# Patient Record
Sex: FEMALE | Race: ASIAN | NOT HISPANIC OR LATINO
[De-identification: names, ages, dates, MRNs, and addresses within clinical notes are randomized per-mention and may not be internally consistent; named-entity substitution may affect disease eponyms.]

---

## 2022-03-08 PROBLEM — Z00.00 ENCOUNTER FOR PREVENTIVE HEALTH EXAMINATION: Status: ACTIVE | Noted: 2022-03-08

## 2022-03-09 ENCOUNTER — NON-APPOINTMENT (OUTPATIENT)
Age: 62
End: 2022-03-09

## 2022-03-14 ENCOUNTER — NON-APPOINTMENT (OUTPATIENT)
Age: 62
End: 2022-03-14

## 2022-03-14 ENCOUNTER — APPOINTMENT (OUTPATIENT)
Dept: ULTRASOUND IMAGING | Facility: CLINIC | Age: 62
End: 2022-03-14
Payer: COMMERCIAL

## 2022-03-14 ENCOUNTER — APPOINTMENT (OUTPATIENT)
Dept: MAMMOGRAPHY | Facility: CLINIC | Age: 62
End: 2022-03-14
Payer: COMMERCIAL

## 2022-03-14 ENCOUNTER — OUTPATIENT (OUTPATIENT)
Dept: OUTPATIENT SERVICES | Facility: HOSPITAL | Age: 62
LOS: 1 days | End: 2022-03-14
Payer: COMMERCIAL

## 2022-03-14 DIAGNOSIS — Z00.00 ENCOUNTER FOR GENERAL ADULT MEDICAL EXAMINATION WITHOUT ABNORMAL FINDINGS: ICD-10-CM

## 2022-03-14 PROCEDURE — 77063 BREAST TOMOSYNTHESIS BI: CPT | Mod: 26,52

## 2022-03-14 PROCEDURE — 76641 ULTRASOUND BREAST COMPLETE: CPT | Mod: 26,RT

## 2022-03-14 PROCEDURE — 76641 ULTRASOUND BREAST COMPLETE: CPT

## 2022-03-14 PROCEDURE — 77067 SCR MAMMO BI INCL CAD: CPT | Mod: 26,52

## 2022-03-14 PROCEDURE — 77063 BREAST TOMOSYNTHESIS BI: CPT

## 2022-03-14 PROCEDURE — 77067 SCR MAMMO BI INCL CAD: CPT

## 2022-03-23 ENCOUNTER — NON-APPOINTMENT (OUTPATIENT)
Age: 62
End: 2022-03-23

## 2022-03-23 ENCOUNTER — APPOINTMENT (OUTPATIENT)
Dept: SURGICAL ONCOLOGY | Facility: CLINIC | Age: 62
End: 2022-03-23
Payer: COMMERCIAL

## 2022-03-23 VITALS
BODY MASS INDEX: 32.25 KG/M2 | SYSTOLIC BLOOD PRESSURE: 163 MMHG | HEART RATE: 53 BPM | WEIGHT: 160 LBS | RESPIRATION RATE: 18 BRPM | HEIGHT: 59 IN | DIASTOLIC BLOOD PRESSURE: 70 MMHG | TEMPERATURE: 97.5 F | OXYGEN SATURATION: 95 %

## 2022-03-23 PROCEDURE — 99244 OFF/OP CNSLTJ NEW/EST MOD 40: CPT

## 2022-03-30 NOTE — ASSESSMENT
[FreeTextEntry1] : Recurrent left breast cancer\par JODI\par BIRADS 2  right breast imaging\par Cont Femara as per med onc\par RTO 1 yr. after yearly right breast imaging

## 2022-03-30 NOTE — ADDENDUM
[FreeTextEntry1] : I, Sarah Jarrett, acted solely as a scribe for Dr. Osman Inman on this date 03/23/2022.\par

## 2022-03-30 NOTE — CONSULT LETTER
[Dear  ___] : Dear  [unfilled], [Consult Letter:] : I had the pleasure of evaluating your patient, [unfilled]. [Please see my note below.] : Please see my note below. [Sincerely,] : Sincerely, [FreeTextEntry3] : Osman Inman MD FACS\par \par

## 2022-03-30 NOTE — PHYSICAL EXAM
[Normal] : supple, no neck mass and thyroid not enlarged [Normal Neck Lymph Nodes] : normal neck lymph nodes  [Normal Supraclavicular Lymph Nodes] : normal supraclavicular lymph nodes [Normal Groin Lymph Nodes] : normal groin lymph nodes [Normal Axillary Lymph Nodes] : normal axillary lymph nodes [Normal] : oriented to person, place and time, with appropriate affect [de-identified] : no masses/adenopathy bilaterally

## 2022-03-30 NOTE — HISTORY OF PRESENT ILLNESS
[de-identified] : Patient is a 60 y/o female who presents an initial consultation \par \par L lumpectomy, chemo, XRT, PRATT 1998\par Recurrence 2002 - MTX, chemo, PRATT\par On AI since 2004 w min SEs\par \par Right mammo/sono 3/14/22 - BIRADS 2\par \par No c/o\par Denies palpable lumps or nipple discharge.

## 2022-10-10 ENCOUNTER — APPOINTMENT (OUTPATIENT)
Dept: PULMONOLOGY | Facility: CLINIC | Age: 62
End: 2022-10-10

## 2022-10-10 VITALS
WEIGHT: 159 LBS | OXYGEN SATURATION: 99 % | HEIGHT: 59 IN | BODY MASS INDEX: 32.05 KG/M2 | DIASTOLIC BLOOD PRESSURE: 80 MMHG | HEART RATE: 65 BPM | TEMPERATURE: 98.9 F | SYSTOLIC BLOOD PRESSURE: 132 MMHG

## 2022-10-10 DIAGNOSIS — Z82.5 FAMILY HISTORY OF ASTHMA AND OTHER CHRONIC LOWER RESPIRATORY DISEASES: ICD-10-CM

## 2022-10-10 DIAGNOSIS — Z85.3 PERSONAL HISTORY OF MALIGNANT NEOPLASM OF BREAST: ICD-10-CM

## 2022-10-10 PROCEDURE — 94060 EVALUATION OF WHEEZING: CPT

## 2022-10-10 PROCEDURE — 94727 GAS DIL/WSHOT DETER LNG VOL: CPT

## 2022-10-10 PROCEDURE — 94729 DIFFUSING CAPACITY: CPT

## 2022-10-10 PROCEDURE — 99204 OFFICE O/P NEW MOD 45 MIN: CPT | Mod: 25

## 2022-10-10 RX ORDER — LETROZOLE TABLETS 2.5 MG/1
TABLET, FILM COATED ORAL
Refills: 0 | Status: ACTIVE | COMMUNITY

## 2022-10-10 RX ORDER — ATORVASTATIN CALCIUM 10 MG/1
10 TABLET, FILM COATED ORAL
Refills: 0 | Status: ACTIVE | COMMUNITY

## 2022-10-10 RX ORDER — AMLODIPINE BESYLATE 10 MG/1
10 TABLET ORAL
Refills: 0 | Status: ACTIVE | COMMUNITY

## 2022-10-10 RX ORDER — METOPROLOL SUCCINATE 50 MG/1
50 TABLET, EXTENDED RELEASE ORAL
Refills: 0 | Status: ACTIVE | COMMUNITY

## 2022-10-10 RX ORDER — ALENDRONATE SODIUM 70 MG/1
70 TABLET ORAL
Refills: 0 | Status: ACTIVE | COMMUNITY

## 2022-10-10 RX ORDER — LEVOTHYROXINE SODIUM 88 UG/1
88 TABLET ORAL
Refills: 0 | Status: ACTIVE | COMMUNITY

## 2022-10-10 NOTE — PROCEDURE
[FreeTextEntry1] : Chest x-ray 10/7/22: Normal. \par \par PFT 10/10/22: Spirometry was normal.  Lung volumes were normal.  Mild reduction in diffusion noted.

## 2022-10-10 NOTE — HISTORY OF PRESENT ILLNESS
[Never] : never [TextBox_4] : She is a 62-year-old woman with a history of hypertension, hyperlipidemia, hypothyroidism and breast cancer.  She had COVID in September 2022.  She presented with a cough.  She denied any prior history of pulmonary disease other than recurrent bronchitis.  She never smoked.\par \par Her current cough for the most part is nonproductive.  She denied any constitutional symptoms.  No shortness of breath or wheezing. [Difficulty Initiating Sleep] : does not have difficulty initiating sleep [Fatigue] : no fatigue

## 2022-10-10 NOTE — DISCUSSION/SUMMARY
[FreeTextEntry1] : She is a 62-year-old woman with a history of hypertension, hyperlipidemia, hypothyroidism and breast cancer.  She had COVID in September 2022.  She presented with a cough.  She denied any prior history of pulmonary disease other than recurrent bronchitis.  She never smoked.\par \par Her current cough for the most part is nonproductive.  She denied any constitutional symptoms.  No shortness of breath or wheezing.\par \par Her cough is most probably on the basis of reactive airways disease.  She was given a trial of Breo and advised to use it for 1 month.  If the cough does not resolve I would like to see her again at that time.

## 2022-10-10 NOTE — PHYSICAL EXAM
[No Acute Distress] : no acute distress [No Neck Mass] : no neck mass [Normal S1, S2] : normal s1, s2 [No Resp Distress] : no resp distress [Clear to Auscultation Bilaterally] : clear to auscultation bilaterally [No Abnormalities] : no abnormalities [No HSM] : no hsm [No Edema] : no edema [Normal Turgor] : normal turgor [No Focal Deficits] : no focal deficits [Oriented x3] : oriented x3 [Normal Affect] : normal affect

## 2022-10-10 NOTE — REVIEW OF SYSTEMS
[Cough] : cough [Thyroid Problem] : thyroid problem [Fever] : no fever [Postnasal Drip] : no postnasal drip [Hemoptysis] : no hemoptysis [Sputum] : no sputum [Dyspnea] : no dyspnea [Wheezing] : no wheezing [Chest Discomfort] : no chest discomfort [Palpitations] : no palpitations [GERD] : no gerd [Myalgias] : no myalgias [Rash] : no rash [History of Iron Deficiency] : no history of iron deficiency [Headache] : no headache [Anxiety] : no anxiety [Diabetes] : no diabetes

## 2022-10-26 ENCOUNTER — TRANSCRIPTION ENCOUNTER (OUTPATIENT)
Age: 62
End: 2022-10-26

## 2022-11-21 ENCOUNTER — APPOINTMENT (OUTPATIENT)
Dept: PULMONOLOGY | Facility: CLINIC | Age: 62
End: 2022-11-21

## 2022-11-21 VITALS
SYSTOLIC BLOOD PRESSURE: 136 MMHG | DIASTOLIC BLOOD PRESSURE: 80 MMHG | OXYGEN SATURATION: 95 % | HEART RATE: 70 BPM | WEIGHT: 160 LBS | BODY MASS INDEX: 32.32 KG/M2 | TEMPERATURE: 97.7 F

## 2022-11-21 DIAGNOSIS — Z86.16 PERSONAL HISTORY OF COVID-19: ICD-10-CM

## 2022-11-21 PROCEDURE — 99213 OFFICE O/P EST LOW 20 MIN: CPT

## 2022-11-21 RX ORDER — PREDNISONE 20 MG/1
20 TABLET ORAL DAILY
Qty: 10 | Refills: 1 | Status: ACTIVE | COMMUNITY
Start: 2022-11-21 | End: 1900-01-01

## 2022-11-21 NOTE — PHYSICAL EXAM
[No Acute Distress] : no acute distress [No Neck Mass] : no neck mass [Normal S1, S2] : normal s1, s2 [No Resp Distress] : no resp distress [No Abnormalities] : no abnormalities [No HSM] : no hsm [No Edema] : no edema [Normal Turgor] : normal turgor [No Focal Deficits] : no focal deficits [Oriented x3] : oriented x3 [Normal Affect] : normal affect [Normal Oropharynx] : normal oropharynx [Rhonchi] : rhonchi [TextBox_68] : minimal rhonchi

## 2022-11-21 NOTE — HISTORY OF PRESENT ILLNESS
[Never] : never [TextBox_4] : She is a 62-year-old woman with a history of hypertension, hyperlipidemia, hypothyroidism and breast cancer.  She had COVID in September 2022.  She presented with a cough.  She denied any prior history of pulmonary disease other than recurrent bronchitis.  She never smoked.\par \par She is still coughing.  It is worse at night.  She uses CPAP at night.  She has been using CPAP for 10 years.  The cough is nonproductive for the most part.  She was recently given doxycycline however the cough has not resolved. [Difficulty Initiating Sleep] : does not have difficulty initiating sleep [Fatigue] : no fatigue

## 2022-11-21 NOTE — DISCUSSION/SUMMARY
[FreeTextEntry1] : She is a 62 year-old woman with a history of hypertension, hyperlipidemia, hypothyroidism and breast cancer.  She had COVID in September 2022.  She presented with a cough.  She denied any prior history of pulmonary disease other than recurrent bronchitis.  She never smoked.\par \par The cough is most likely on the basis of reactive airways disease.  She was given prednisone 20 mg/day for 10 days.  Montelukast was also added.  She is to continue with Breo for the time being.  A chest radiograph was requested.  Follow-up with me in 1 month advised.  I will see her sooner than that if her cough gets any worse.

## 2022-11-21 NOTE — REASON FOR VISIT
Problem List Items Addressed This Visit     Supervision of normal intrauterine pregnancy in multigravida, third trimester - Primary     Feels well overall  Good fetal movement  No ctx, LOF, bleeding  GBS neg  Plan for repeat   but will likely TOLAC if she goes into labor first  [Cough] : cough [Follow-Up] : a follow-up visit

## 2022-11-21 NOTE — REVIEW OF SYSTEMS
[Cough] : cough [Thyroid Problem] : thyroid problem [Fever] : no fever [Chills] : no chills [Postnasal Drip] : no postnasal drip [Hemoptysis] : no hemoptysis [Chest Tightness] : no chest tightness [Sputum] : no sputum [Dyspnea] : no dyspnea [Wheezing] : no wheezing [Chest Discomfort] : no chest discomfort [Palpitations] : no palpitations [GERD] : no gerd [Myalgias] : no myalgias [Rash] : no rash [History of Iron Deficiency] : no history of iron deficiency [Headache] : no headache [Anxiety] : no anxiety [Diabetes] : no diabetes

## 2022-12-16 ENCOUNTER — APPOINTMENT (OUTPATIENT)
Dept: PULMONOLOGY | Facility: CLINIC | Age: 62
End: 2022-12-16

## 2022-12-16 VITALS
SYSTOLIC BLOOD PRESSURE: 134 MMHG | OXYGEN SATURATION: 99 % | HEART RATE: 67 BPM | DIASTOLIC BLOOD PRESSURE: 76 MMHG | TEMPERATURE: 98 F

## 2022-12-16 PROCEDURE — 99214 OFFICE O/P EST MOD 30 MIN: CPT

## 2022-12-16 NOTE — DISCUSSION/SUMMARY
[FreeTextEntry1] : She is a 62 year-old woman with a history of hypertension, hyperlipidemia, hypothyroidism, breast cancer and HILDA.  She had COVID in September 2022.  She never smoked.\par \par She continues to complain of a chronic cough and mucus production.  This has been a persistent problem for years, she says.  The history is suggestive of bronchiectasis.  A CT of the chest will be obtained.  Blood work will be obtained as well.  For the time being she is to continue with Breo and montelukast.  She was placed on antibiotics by her primary care provider which she will continue with.  She is to continue with CPAP for her HILDA.

## 2022-12-16 NOTE — HISTORY OF PRESENT ILLNESS
[Never] : never [Obstructive Sleep Apnea] : obstructive sleep apnea [TextBox_4] : She is a 62 year-old woman with a history of hypertension, hyperlipidemia, hypothyroidism, breast cancer and HILDA.  She had COVID in September 2022.  She presented with a cough.  She denied any prior history of pulmonary disease other than recurrent bronchitis.  She never smoked. She uses CPAP at night.  She has been using CPAP for 10 years.  \par \par She is still coughing. On Breo. It is not helping much. Was given Cipro this week.  [Difficulty Initiating Sleep] : does not have difficulty initiating sleep [Fatigue] : no fatigue

## 2022-12-16 NOTE — REVIEW OF SYSTEMS
[Cough] : cough [Thyroid Problem] : thyroid problem [Fever] : no fever [Fatigue] : no fatigue [Postnasal Drip] : no postnasal drip [Hemoptysis] : no hemoptysis [Chest Tightness] : no chest tightness [Sputum] : no sputum [Dyspnea] : no dyspnea [Pleuritic Pain] : no pleuritic pain [Wheezing] : no wheezing [Chest Discomfort] : no chest discomfort [Palpitations] : no palpitations [GERD] : no gerd [Myalgias] : no myalgias [Rash] : no rash [History of Iron Deficiency] : no history of iron deficiency [Headache] : no headache [Anxiety] : no anxiety [Diabetes] : no diabetes

## 2022-12-16 NOTE — PHYSICAL EXAM
[No Acute Distress] : no acute distress [Normal Oropharynx] : normal oropharynx [No Neck Mass] : no neck mass [Normal S1, S2] : normal s1, s2 [No Resp Distress] : no resp distress [Rhonchi] : rhonchi [No Abnormalities] : no abnormalities [No HSM] : no hsm [No Edema] : no edema [Normal Turgor] : normal turgor [No Focal Deficits] : no focal deficits [Oriented x3] : oriented x3 [Normal Affect] : normal affect [Normal Rate/Rhythm] : normal rate/rhythm [No Acc Muscle Use] : no acc muscle use [Benign] : benign [Normal Gait] : normal gait [No Clubbing] : no clubbing [No Cyanosis] : no cyanosis [TextBox_68] : minimal rhonchi

## 2023-01-11 ENCOUNTER — OUTPATIENT (OUTPATIENT)
Dept: OUTPATIENT SERVICES | Facility: HOSPITAL | Age: 63
LOS: 1 days | End: 2023-01-11
Payer: COMMERCIAL

## 2023-01-11 ENCOUNTER — APPOINTMENT (OUTPATIENT)
Dept: CT IMAGING | Facility: CLINIC | Age: 63
End: 2023-01-11
Payer: COMMERCIAL

## 2023-01-11 DIAGNOSIS — Z00.8 ENCOUNTER FOR OTHER GENERAL EXAMINATION: ICD-10-CM

## 2023-01-11 PROCEDURE — 71250 CT THORAX DX C-: CPT | Mod: 26

## 2023-01-11 PROCEDURE — 71250 CT THORAX DX C-: CPT

## 2023-01-26 ENCOUNTER — LABORATORY RESULT (OUTPATIENT)
Age: 63
End: 2023-01-26

## 2023-01-27 LAB
DEPRECATED KAPPA LC FREE/LAMBDA SER: 1.44 RATIO
IGA SER QL IEP: 285 MG/DL
IGG SER QL IEP: 1199 MG/DL
IGM SER QL IEP: 68 MG/DL
KAPPA LC CSF-MCNC: 1.04 MG/DL
KAPPA LC SERPL-MCNC: 1.5 MG/DL

## 2023-02-01 LAB
A ALTERNATA IGE QN: <0.1 KUA/L
A FUMIGATUS IGE QN: <0.1 KUA/L
C ALBICANS IGE QN: <0.1 KUA/L
C HERBARUM IGE QN: <0.1 KUA/L
CAT DANDER IGE QN: <0.1 KUA/L
COMMON RAGWEED IGE QN: <0.1 KUA/L
D FARINAE IGE QN: <0.1 KUA/L
D PTERONYSS IGE QN: <0.1 KUA/L
DEPRECATED A ALTERNATA IGE RAST QL: 0
DEPRECATED A FUMIGATUS IGE RAST QL: 0
DEPRECATED C ALBICANS IGE RAST QL: 0
DEPRECATED C HERBARUM IGE RAST QL: 0
DEPRECATED CAT DANDER IGE RAST QL: 0
DEPRECATED COMMON RAGWEED IGE RAST QL: 0
DEPRECATED D FARINAE IGE RAST QL: 0
DEPRECATED D PTERONYSS IGE RAST QL: 0
DEPRECATED DOG DANDER IGE RAST QL: 0
DEPRECATED M RACEMOSUS IGE RAST QL: 0
DEPRECATED ROACH IGE RAST QL: 0
DEPRECATED TIMOTHY IGE RAST QL: 0
DEPRECATED WHITE OAK IGE RAST QL: 0
DOG DANDER IGE QN: <0.1 KUA/L
M RACEMOSUS IGE QN: <0.1 KUA/L
ROACH IGE QN: <0.1 KUA/L
TIMOTHY IGE QN: <0.1 KUA/L
WHITE OAK IGE QN: <0.1 KUA/L

## 2023-02-08 ENCOUNTER — APPOINTMENT (OUTPATIENT)
Dept: PULMONOLOGY | Facility: CLINIC | Age: 63
End: 2023-02-08
Payer: COMMERCIAL

## 2023-02-08 VITALS — WEIGHT: 161.2 LBS | OXYGEN SATURATION: 96 % | HEART RATE: 62 BPM | BODY MASS INDEX: 32.56 KG/M2

## 2023-02-08 DIAGNOSIS — Z86.16 PERSONAL HISTORY OF COVID-19: ICD-10-CM

## 2023-02-08 PROCEDURE — 99214 OFFICE O/P EST MOD 30 MIN: CPT

## 2023-02-08 NOTE — REVIEW OF SYSTEMS
[Cough] : cough [Thyroid Problem] : thyroid problem [Fever] : no fever [Postnasal Drip] : no postnasal drip [Hemoptysis] : no hemoptysis [Dyspnea] : no dyspnea [Wheezing] : no wheezing [Chest Discomfort] : no chest discomfort [Palpitations] : no palpitations [GERD] : no gerd [Myalgias] : no myalgias [Rash] : no rash [History of Iron Deficiency] : no history of iron deficiency [Headache] : no headache [Anxiety] : no anxiety [Diabetes] : no diabetes

## 2023-02-08 NOTE — PHYSICAL EXAM
[No Acute Distress] : no acute distress [No Neck Mass] : no neck mass [Normal Rate/Rhythm] : normal rate/rhythm [Normal S1, S2] : normal s1, s2 [No Resp Distress] : no resp distress [No Acc Muscle Use] : no acc muscle use [No Abnormalities] : no abnormalities [Benign] : benign [No HSM] : no hsm [Normal Gait] : normal gait [No Clubbing] : no clubbing [No Cyanosis] : no cyanosis [No Edema] : no edema [Normal Turgor] : normal turgor [No Focal Deficits] : no focal deficits [Oriented x3] : oriented x3 [Normal Affect] : normal affect [Clear to Auscultation Bilaterally] : clear to auscultation bilaterally [TextBox_68] : minimal rhonchi

## 2023-02-08 NOTE — HISTORY OF PRESENT ILLNESS
[Never] : never [Obstructive Sleep Apnea] : obstructive sleep apnea [TextBox_4] : She is a 62 year-old woman with a history of hypertension, hyperlipidemia, hypothyroidism, breast cancer and HILDA.  She had COVID in September 2022.  She presented with a cough.  She denied any prior history of pulmonary disease other than recurrent bronchitis.  She never smoked. She uses CPAP at night.  She has been using CPAP for 10 years.  \par \par She is still coughing at night. The cough is less during the day now. \par \par On Breo, montelukast and albuterol as needed.  [Difficulty Initiating Sleep] : does not have difficulty initiating sleep [Fatigue] : no fatigue

## 2023-02-08 NOTE — DISCUSSION/SUMMARY
[FreeTextEntry1] : She is a 62 year-old woman with a history of hypertension, hyperlipidemia, hypothyroidism, breast cancer and HILDA.  She had COVID in September 2022.  She never smoked.\par \par Her cough has improved.  Her CT was negative.  She was advised to continue with Breo and montelukast.  She is on CPAP and takes care of her machine regularly.  No further investigation needed at this time.  Her medications were n renewed.  She was started on Flonase for postnasal drip.  \par \par Follow-up in 6 months advised.\par

## 2023-02-08 NOTE — PROCEDURE
[FreeTextEntry1] : Chest x-ray 10/7/22: Normal. \par \par PFT 10/10/22: Spirometry was normal.  Lung volumes were normal.  Mild reduction in diffusion noted.\par \par CT Chest 1/11/23: No bronchiectasis.  No nodules.  No effusions.

## 2023-02-27 ENCOUNTER — RX RENEWAL (OUTPATIENT)
Age: 63
End: 2023-02-27

## 2023-03-14 ENCOUNTER — NON-APPOINTMENT (OUTPATIENT)
Age: 63
End: 2023-03-14

## 2023-03-15 NOTE — PHYSICAL EXAM
[Normal] : supple, no neck mass and thyroid not enlarged [Normal Neck Lymph Nodes] : normal neck lymph nodes  [Normal Supraclavicular Lymph Nodes] : normal supraclavicular lymph nodes [Normal Groin Lymph Nodes] : normal groin lymph nodes [Normal Axillary Lymph Nodes] : normal axillary lymph nodes [Normal] : oriented to person, place and time, with appropriate affect [de-identified] : no masses/adenopathy bilaterally

## 2023-03-15 NOTE — HISTORY OF PRESENT ILLNESS
[de-identified] : Patient is a 63 y/o female who presents a follow up.\par \par L lumpectomy, chemo, XRT, PRATT 1998\par Recurrence 2002 - MTX, chemo, PRATT\par On AI since 2004 w min SEs\par \par Right mammo/sono 3/14/22 - BIRADS 2\par \par No c/o\par Denies palpable lumps or nipple discharge. no

## 2023-03-15 NOTE — ADDENDUM
[FreeTextEntry1] : I, Sarah Jarrett, acted solely as a scribe for Dr. Osman Inman on this date 03/22/2023.

## 2023-03-22 ENCOUNTER — APPOINTMENT (OUTPATIENT)
Dept: SURGICAL ONCOLOGY | Facility: CLINIC | Age: 63
End: 2023-03-22
Payer: COMMERCIAL

## 2023-03-31 ENCOUNTER — APPOINTMENT (OUTPATIENT)
Dept: SURGICAL ONCOLOGY | Facility: CLINIC | Age: 63
End: 2023-03-31
Payer: COMMERCIAL

## 2023-03-31 ENCOUNTER — RESULT REVIEW (OUTPATIENT)
Age: 63
End: 2023-03-31

## 2023-04-28 ENCOUNTER — APPOINTMENT (OUTPATIENT)
Dept: MAMMOGRAPHY | Facility: CLINIC | Age: 63
End: 2023-04-28
Payer: COMMERCIAL

## 2023-04-28 ENCOUNTER — RESULT REVIEW (OUTPATIENT)
Age: 63
End: 2023-04-28

## 2023-04-28 ENCOUNTER — OUTPATIENT (OUTPATIENT)
Dept: OUTPATIENT SERVICES | Facility: HOSPITAL | Age: 63
LOS: 1 days | End: 2023-04-28
Payer: COMMERCIAL

## 2023-04-28 ENCOUNTER — APPOINTMENT (OUTPATIENT)
Dept: ULTRASOUND IMAGING | Facility: CLINIC | Age: 63
End: 2023-04-28
Payer: COMMERCIAL

## 2023-04-28 DIAGNOSIS — C50.912 MALIGNANT NEOPLASM OF UNSPECIFIED SITE OF LEFT FEMALE BREAST: ICD-10-CM

## 2023-04-28 PROCEDURE — 77067 SCR MAMMO BI INCL CAD: CPT | Mod: 26,RT,52

## 2023-04-28 PROCEDURE — 76641 ULTRASOUND BREAST COMPLETE: CPT

## 2023-04-28 PROCEDURE — 77063 BREAST TOMOSYNTHESIS BI: CPT

## 2023-04-28 PROCEDURE — 76641 ULTRASOUND BREAST COMPLETE: CPT | Mod: 26,RT

## 2023-04-28 PROCEDURE — 77067 SCR MAMMO BI INCL CAD: CPT

## 2023-04-28 PROCEDURE — 77063 BREAST TOMOSYNTHESIS BI: CPT | Mod: 26,52

## 2023-05-08 ENCOUNTER — APPOINTMENT (OUTPATIENT)
Dept: ULTRASOUND IMAGING | Facility: CLINIC | Age: 63
End: 2023-05-08

## 2023-05-08 ENCOUNTER — OUTPATIENT (OUTPATIENT)
Dept: OUTPATIENT SERVICES | Facility: HOSPITAL | Age: 63
LOS: 1 days | End: 2023-05-08
Payer: COMMERCIAL

## 2023-05-08 ENCOUNTER — APPOINTMENT (OUTPATIENT)
Dept: ULTRASOUND IMAGING | Facility: CLINIC | Age: 63
End: 2023-05-08
Payer: COMMERCIAL

## 2023-05-08 DIAGNOSIS — Z00.8 ENCOUNTER FOR OTHER GENERAL EXAMINATION: ICD-10-CM

## 2023-05-08 PROCEDURE — 76830 TRANSVAGINAL US NON-OB: CPT | Mod: 26

## 2023-05-08 PROCEDURE — 76830 TRANSVAGINAL US NON-OB: CPT

## 2023-05-08 PROCEDURE — 76856 US EXAM PELVIC COMPLETE: CPT

## 2023-05-08 PROCEDURE — 76856 US EXAM PELVIC COMPLETE: CPT | Mod: 26,59

## 2023-05-22 ENCOUNTER — APPOINTMENT (OUTPATIENT)
Dept: MRI IMAGING | Facility: CLINIC | Age: 63
End: 2023-05-22
Payer: COMMERCIAL

## 2023-05-22 PROCEDURE — 72197 MRI PELVIS W/O & W/DYE: CPT

## 2023-05-22 PROCEDURE — A9585: CPT

## 2023-05-24 ENCOUNTER — APPOINTMENT (OUTPATIENT)
Dept: PULMONOLOGY | Facility: CLINIC | Age: 63
End: 2023-05-24
Payer: COMMERCIAL

## 2023-05-24 VITALS
TEMPERATURE: 96.8 F | OXYGEN SATURATION: 99 % | SYSTOLIC BLOOD PRESSURE: 142 MMHG | BODY MASS INDEX: 32.32 KG/M2 | DIASTOLIC BLOOD PRESSURE: 82 MMHG | HEART RATE: 66 BPM | WEIGHT: 160 LBS

## 2023-05-24 PROCEDURE — 99214 OFFICE O/P EST MOD 30 MIN: CPT

## 2023-05-24 NOTE — PHYSICAL EXAM
[No Acute Distress] : no acute distress [No Neck Mass] : no neck mass [Normal Rate/Rhythm] : normal rate/rhythm [Normal S1, S2] : normal s1, s2 [No Resp Distress] : no resp distress [No Acc Muscle Use] : no acc muscle use [No Abnormalities] : no abnormalities [Benign] : benign [No HSM] : no hsm [Normal Gait] : normal gait [No Clubbing] : no clubbing [No Cyanosis] : no cyanosis [No Edema] : no edema [Normal Turgor] : normal turgor [No Focal Deficits] : no focal deficits [Oriented x3] : oriented x3 [Normal Affect] : normal affect [Normal Appearance] : normal appearance [Rhonchi] : rhonchi [TextBox_68] : minimal rhonchi

## 2023-05-24 NOTE — REVIEW OF SYSTEMS
[Cough] : cough [Thyroid Problem] : thyroid problem [Fever] : no fever [Fatigue] : no fatigue [Postnasal Drip] : no postnasal drip [Hemoptysis] : no hemoptysis [Wheezing] : wheezing [Chest Discomfort] : no chest discomfort [Palpitations] : no palpitations [GERD] : no gerd [Myalgias] : no myalgias [Rash] : no rash [History of Iron Deficiency] : no history of iron deficiency [Headache] : no headache [Anxiety] : no anxiety [Diabetes] : no diabetes

## 2023-05-24 NOTE — HISTORY OF PRESENT ILLNESS
[Never] : never [Obstructive Sleep Apnea] : obstructive sleep apnea [TextBox_4] : She is a 62 year-old woman with a history of hypertension, hyperlipidemia, hypothyroidism, breast cancer and HILDA.  She had COVID in September 2022.  She presented with a cough.  She denied any prior history of pulmonary disease other than recurrent bronchitis.  She never smoked. She uses CPAP at night.  She has been using CPAP for 10 years.  \par \par Has wheezing on and off. On Breo and montelukast. \par  [Difficulty Initiating Sleep] : does not have difficulty initiating sleep [Fatigue] : no fatigue

## 2023-05-24 NOTE — DISCUSSION/SUMMARY
[FreeTextEntry1] : She is a 62 year-old woman with a history of hypertension, hyperlipidemia, hypothyroidism, breast cancer and HILDA.  She had COVID in September 2022.  She never smoked.\par  \par Impression\par Postinflammatory cough\par - Improved \par Obstructive sleep apnea\par \par Plan\par Continue with Breo and montelukast\par Continue with CPAP\par Complete work-up for pelvic mass\par Follow-up in 6 months advised\par

## 2023-06-05 NOTE — ADDENDUM
[FreeTextEntry1] : I, Sarah Jarrett, acted solely as a scribe for Dr. Osman Inman on this date 03/31/2023.

## 2023-06-05 NOTE — HISTORY OF PRESENT ILLNESS
[de-identified] : Patient is a 63 y/o female who presents for a breast cancer follow up.\par \par L lumpectomy, chemo, XRT, PRATT 1998\par Recurrence 2002 - MTX, chemo, PRATT\par On AI since 2004 w min SEs\par \par Right mammo/sono 4/28/23- BIRADS 1\par \par Right mammo/sono 3/14/22 - BIRADS 2\par \par Right mammo/sono pending on 4/28/23. \par \par No c/o\par Denies palpable lumps or nipple discharge.

## 2023-06-05 NOTE — ASSESSMENT
[FreeTextEntry1] : Recurrent left breast cancer\par JODI\par BIRADS 2  right breast imaging 2023\par Cont Femara as per med onc\par Right breast imaging 4/2023 (ordered)\par RTO yearly

## 2023-06-09 ENCOUNTER — APPOINTMENT (OUTPATIENT)
Dept: SURGICAL ONCOLOGY | Facility: CLINIC | Age: 63
End: 2023-06-09
Payer: COMMERCIAL

## 2023-06-21 ENCOUNTER — APPOINTMENT (OUTPATIENT)
Dept: UROLOGY | Facility: CLINIC | Age: 63
End: 2023-06-21
Payer: COMMERCIAL

## 2023-06-21 VITALS
WEIGHT: 166 LBS | RESPIRATION RATE: 17 BRPM | DIASTOLIC BLOOD PRESSURE: 77 MMHG | BODY MASS INDEX: 33.47 KG/M2 | SYSTOLIC BLOOD PRESSURE: 152 MMHG | HEIGHT: 59 IN | HEART RATE: 63 BPM | OXYGEN SATURATION: 96 %

## 2023-06-21 DIAGNOSIS — N28.1 CYST OF KIDNEY, ACQUIRED: ICD-10-CM

## 2023-06-21 DIAGNOSIS — Z86.79 PERSONAL HISTORY OF OTHER DISEASES OF THE CIRCULATORY SYSTEM: ICD-10-CM

## 2023-06-21 DIAGNOSIS — Z80.0 FAMILY HISTORY OF MALIGNANT NEOPLASM OF DIGESTIVE ORGANS: ICD-10-CM

## 2023-06-21 DIAGNOSIS — Z80.7 FAMILY HISTORY OF OTHER MALIGNANT NEOPLASMS OF LYMPHOID, HEMATOPOIETIC AND RELATED TISSUES: ICD-10-CM

## 2023-06-21 DIAGNOSIS — Z80.3 FAMILY HISTORY OF MALIGNANT NEOPLASM OF BREAST: ICD-10-CM

## 2023-06-21 DIAGNOSIS — J47.9 BRONCHIECTASIS, UNCOMPLICATED: ICD-10-CM

## 2023-06-21 DIAGNOSIS — Z78.9 OTHER SPECIFIED HEALTH STATUS: ICD-10-CM

## 2023-06-21 DIAGNOSIS — Z86.39 PERSONAL HISTORY OF OTHER ENDOCRINE, NUTRITIONAL AND METABOLIC DISEASE: ICD-10-CM

## 2023-06-21 DIAGNOSIS — N39.46 MIXED INCONTINENCE: ICD-10-CM

## 2023-06-21 PROCEDURE — 99203 OFFICE O/P NEW LOW 30 MIN: CPT

## 2023-06-21 NOTE — LETTER BODY
[FreeTextEntry1] : Peyman Velasquez MD\par [address]\[place] [address]\par P [phone]\par F [phone]\par \par Dear Dr. Velasquez,\par  \par Reason for visit: Abnormal imaging. Left renal cyst. Urge and stress incontinence.\par \par This is a 62 year-old woman with abnormal abdominal imaging. Patient underwent MRI of Pelvis ordered by her gynecologist for right ovarian cyst.  The MRI demonstrated incidental left renal lesion.  Patient denies any flank pain, hematuria, or urinary difficulties. Patient admits having urge and stress urinary incontinences, aggravated by cough and sneezes. The patient denies any relieving factors. The patient denies any interference of function. All other review of systems are negative. She has breast cancer in her family medical history. She had surgical histories of C-sections and mastectomy with flap reconstruction. Past medical history, family history and social history were inquired and were noncontributory to current condition. The patient does not use tobacco or drink alcohol. Medications and allergies were reviewed. She has no known allergies to medication.\par \par On examination, the patient is a well-appearing woman in no acute distress. She is alert and oriented follows commands. She has normal mood and affect. She is normocephalic. Neck is supple. Oral no thrush. Respirations are unlabored. Abdomen is soft and nontender. Bladder is nonpalpable. No CVA tenderness. Neurologically she is grossly intact. No peripheral edema. Skin without gross abnormality.  Patient has right-sided meniscus tear.  She is currently ambulating with a cane.\par \par I personally reviewed the MRI  scan with patient today. MRI scan demonstrated mildly complex 6 cm left renal cyst.  The lesion is most likely a parapelvic cyst.\par \par Assessment: Abnormal urinary imaging. Left renal cyst. Urge and stress incontinences.\par \par I counseled the patient. I recommended she obtain urinalysis to evaluate for baseline urine status. In terms of left renal cyst, I reassured the patient.  The lesion is benign.  Surgical intervention is not indicated.  The renal cyst would not cause her to have flank pain.  I recommended patient obtain renal ultrasound in 6 months. I counseled the patient regarding the procedure.  In terms of urinary incontinence, patient currently declines medical therapy.  The risks and benefits were discussed. Alternatives were given. I answered the patient questions. The patient will take the necessary preparations for the procedure. The patient will follow-up as directed and will contact me with any questions or concerns. Thank you for the opportunity to participate in the care of Ms. MEDRANO. I will keep you updated on her progress.\par \par Plan: Urinalysis and culture. Follow up in 6 month for renal ultrasound.\par \par CY

## 2023-06-21 NOTE — HISTORY OF PRESENT ILLNESS
[FreeTextEntry1] : New patient referred by GYN, had MR Pelvis done demonstrated 5.8 cm left renal cyst\par Symptom of urge and stress incontinence triggers by cough and sneeze since August 2022\par \par Plan: urinalysis, culture. Follow up in 6 month for renal ultrasound.\par \par CY

## 2023-06-23 ENCOUNTER — APPOINTMENT (OUTPATIENT)
Dept: SURGICAL ONCOLOGY | Facility: CLINIC | Age: 63
End: 2023-06-23
Payer: COMMERCIAL

## 2023-06-23 VITALS
OXYGEN SATURATION: 99 % | SYSTOLIC BLOOD PRESSURE: 123 MMHG | HEIGHT: 59 IN | HEART RATE: 61 BPM | BODY MASS INDEX: 33.47 KG/M2 | WEIGHT: 166 LBS | DIASTOLIC BLOOD PRESSURE: 75 MMHG | RESPIRATION RATE: 16 BRPM

## 2023-06-23 DIAGNOSIS — C50.912 MALIGNANT NEOPLASM OF UNSPECIFIED SITE OF LEFT FEMALE BREAST: ICD-10-CM

## 2023-06-23 PROCEDURE — 99215 OFFICE O/P EST HI 40 MIN: CPT

## 2023-06-23 NOTE — ASSESSMENT
[FreeTextEntry1] : Recurrent left breast cancer\par JODI\par BI-RADS 1 right breast imaging April 2023\par Continue right breast imaging April 2024 \par Continue letrozole as per medical oncology\par Gynecology regarding ovarian cyst\par RTO 1 year

## 2023-06-23 NOTE — PHYSICAL EXAM
[Normal] : supple, no neck mass and thyroid not enlarged [Normal Supraclavicular Lymph Nodes] : normal supraclavicular lymph nodes [Normal Axillary Lymph Nodes] : normal axillary lymph nodes [Normal] : oriented to person, place and time, with appropriate affect [de-identified] : reconstructed left breast well healed.  no masses or adenopathy bilaterally.

## 2023-06-23 NOTE — ADDENDUM
[FreeTextEntry1] : I, Saarh Jarrett, acted solely as a scribe for Dr. Osman Inman on this date 06/23/2023.

## 2023-06-23 NOTE — HISTORY OF PRESENT ILLNESS
[de-identified] : Patient is a 61 y/o female who presents for a breast cancer follow up.\par L lumpectomy, chemo, XRT, PRATT 1998\par Recurrence 2002 - MTX, chemo, PRATT\par She was onn AI since 2004.\par \par Pt was seen by GYN because she was found to have a 8 cm ovarian cyst and is scheduled to see Dr. Bryan Landers in August. \par \par Right mammo/sono 4/28/23- BI-RADS 1\par \par Right mammo/sono 3/14/22 - BI-RADS 2\par \par Right mammo/sono pending on 4/28/23. \par \par Denies palpable lumps or nipple discharge.

## 2023-06-24 LAB
APPEARANCE: CLEAR
BACTERIA UR CULT: NORMAL
BACTERIA: NEGATIVE /HPF
BILIRUBIN URINE: NEGATIVE
BLOOD URINE: NEGATIVE
CAST: 0 /LPF
COLOR: YELLOW
EPITHELIAL CELLS: 0 /HPF
GLUCOSE QUALITATIVE U: NEGATIVE MG/DL
KETONES URINE: NEGATIVE MG/DL
LEUKOCYTE ESTERASE URINE: NEGATIVE
MICROSCOPIC-UA: NORMAL
NITRITE URINE: NEGATIVE
PH URINE: 7
PROTEIN URINE: NEGATIVE MG/DL
RED BLOOD CELLS URINE: 0 /HPF
SPECIFIC GRAVITY URINE: 1.01
UROBILINOGEN URINE: 0.2 MG/DL
WHITE BLOOD CELLS URINE: 0 /HPF

## 2023-07-12 ENCOUNTER — APPOINTMENT (OUTPATIENT)
Dept: OBGYN | Facility: CLINIC | Age: 63
End: 2023-07-12

## 2023-07-13 ENCOUNTER — APPOINTMENT (OUTPATIENT)
Dept: OBGYN | Facility: CLINIC | Age: 63
End: 2023-07-13
Payer: COMMERCIAL

## 2023-07-13 VITALS
RESPIRATION RATE: 17 BRPM | HEART RATE: 70 BPM | BODY MASS INDEX: 32.66 KG/M2 | OXYGEN SATURATION: 98 % | HEIGHT: 59 IN | WEIGHT: 162 LBS | SYSTOLIC BLOOD PRESSURE: 132 MMHG | DIASTOLIC BLOOD PRESSURE: 84 MMHG

## 2023-07-13 DIAGNOSIS — N83.209 UNSPECIFIED OVARIAN CYST, UNSPECIFIED SIDE: ICD-10-CM

## 2023-07-13 PROCEDURE — 99203 OFFICE O/P NEW LOW 30 MIN: CPT

## 2023-07-18 PROBLEM — N83.209 SIMPLE OVARIAN CYST: Status: ACTIVE | Noted: 2023-07-18

## 2023-07-18 NOTE — END OF VISIT
[FreeTextEntry3] : I, Damon Galvan, acted as a scribe on behalf of Dr. Marcy Landers on 07/13/2023 .\par \par All medical entries made by the scribe were at my, Dr. Marcy Landers's, direction and personally dictated by me on 07/13/2023. I have reviewed the chart and agree that the record accurately reflects my personal performance of the history, physical exam, assessment and plan. I have also personally directed, reviewed, and agreed with the chart.

## 2023-07-18 NOTE — CONSULT LETTER
[Dear  ___] : Dear  [unfilled], [Consult Letter:] : I had the pleasure of evaluating your patient, [unfilled]. [Please see my note below.] : Please see my note below. [Consult Closing:] : Thank you very much for allowing me to participate in the care of this patient.  If you have any questions, please do not hesitate to contact me. [Sincerely,] : Sincerely, [FreeTextEntry2] : Pilar Cedeño, \par [address] [facility] [address], [place] [address] [FreeTextEntry3] : Marcy Landers MD

## 2023-07-18 NOTE — PLAN
[FreeTextEntry1] : 62 yo  postmenopausal female with 8 cm simple right ovarian cyst, on letrozole for recurrent breast CA. Sleep Apneic on CPAP.\par \par Discussed the management of ovarian cysts at length.  Risks including but not limited to torsion, rupture and malignancy were discussed.  With findings consistent with probable functional ovarian cyst will continue with continued conservative followup with sonography.\par \par -negative tumor markers\par -reviewed pelvic US and MRI w/ pt\par -schedule laparoscopic BSO\par -medical clearance

## 2023-07-18 NOTE — HISTORY OF PRESENT ILLNESS
[FreeTextEntry1] : 64 yo  postmenopausal female presents for consultation regarding ovarian cyst. Pt was referred by Dr. Pilar Cedeño. Since last year, pt reports abdominal distention, urge to urinate w/ straining and unintended bowel movements when urinating, urinary stress incontinence, pressure after eating, and gas pain. Also reports lower back pain. Pt saw urology Dr. Jose Lira and no blockage was found. Pt had negative tumor markers. She reports regular pelvic imaging up until 2018, no ovarian cyst found, discontinued routine GYN care/imaging due to pandemic until . Most recent pelvic imaging found 8cm right ovarian cyst, as well as a left renal cyst. Pt has recurrent breast CA, currently on letrozole since . She desires removal of both ovaries.\par \par \par OB:\par 1989 C/S FT, 7.3lbs\par 1990 C?S FT, 7.2lbs\par  TOP\par 1993 C/S FT 6.9lbs\par \par GYN: recurrent breast CA (BRCA negative; L lumpectomy, chemo, XRT, PRATT ; MTX, chemo, PRATT for recurrence ; on AI since ), denies h/o abnml pap/HPV\par Medical: osteopenia, sleep apnea (on CPAP), hypothyroidism, HTN, pre-DM (on Ozempic), OA, HLD, anxiety, asthma,\par Surgical: L lumpectomy x3, L mastectomy (2002), C/S x3\par Family: Breast CA (maternal first cousin), mother- hypothyroidism, father- esophageal CA, sibling- HTN, brother0 lymphoma\par Meds: alendronate 70mg, amlodipine 10mg, atorvastatin 10mg, Breo Ellipta, celecoxib 200mg, fluticasone propionate, letrozole 2.5mg, levocetirizine 5mg, meloxicam 7.5mg,  metoprolol succinate ER 50mg, montelukast 10mg, Synthroid 88mcg, Ozempic\par NKDA\par Social: no t/d/a\par \par \par Pelvic US 23:\par UTERUS: 6.3 x 1.9 x 4.1 cm. WNL\par ENDOMETRIUM: 3mm. WNL\par RIGHT OVARY: not seen, 8 cm midline cystic structure of unknown origin\par LEFT OVARY: 1.2 x 0.7 x 1.4 cm, WNL\par FLUID: none.\par \par Impressions:\par 8 cm midline cystic structure of unknown origin. A separate right ovary is not seen. Given the large size, pelvic MRI with and without contrast is recommended fur further evaluation.\par Normal thickness endometrium.\par \par \par \par \par Pelvic MRI 23:\par UTERUS: normal in size, no fibroids.\par ENDOMETRIUM: poorly delineated due to motion artifact, likely from bowel peristalsis. Does not appear thickened.\par RIGHT OVARY: 8 x 6 cm simple right ovarian cyst. Thinned ovairna parenchyma seen in the periphery of cyst.\par LEFT OVARY: atrophic, normal for age.\par BLADDER: wnl\par LYMPH NODES: no pelvic lymphadenopathy.\par \par Visualized Portions:\par ABDOMINAL ORGANS: 5.8 cm thinly septated left parapelvic renal cyst. Subcentimeter hepatic cysts.\par BOWEL: wnl\par PERITONEUM: no ascites\par VESSELS: wnl\par ABDOMINAL WALL: wnl\par BONES: wnl\par \par Impressions:\par 8 cm simple right ovarian cyst, very likely benign. Follow-up pelvic ultrasound MRI recommended in one year.\par

## 2023-07-19 ENCOUNTER — APPOINTMENT (OUTPATIENT)
Dept: OBGYN | Facility: CLINIC | Age: 63
End: 2023-07-19

## 2023-08-02 ENCOUNTER — APPOINTMENT (OUTPATIENT)
Dept: OBGYN | Facility: CLINIC | Age: 63
End: 2023-08-02

## 2023-09-06 ENCOUNTER — RX RENEWAL (OUTPATIENT)
Age: 63
End: 2023-09-06

## 2023-10-16 ENCOUNTER — RX RENEWAL (OUTPATIENT)
Age: 63
End: 2023-10-16

## 2023-10-16 RX ORDER — FLUTICASONE PROPIONATE 50 UG/1
50 SPRAY, METERED NASAL DAILY
Qty: 16 | Refills: 2 | Status: ACTIVE | COMMUNITY
Start: 2023-02-08 | End: 1900-01-01

## 2023-10-26 ENCOUNTER — APPOINTMENT (OUTPATIENT)
Dept: PULMONOLOGY | Facility: CLINIC | Age: 63
End: 2023-10-26
Payer: COMMERCIAL

## 2023-10-26 VITALS
BODY MASS INDEX: 31.71 KG/M2 | DIASTOLIC BLOOD PRESSURE: 70 MMHG | HEART RATE: 72 BPM | WEIGHT: 157 LBS | TEMPERATURE: 96.2 F | OXYGEN SATURATION: 97 % | SYSTOLIC BLOOD PRESSURE: 142 MMHG

## 2023-10-26 PROCEDURE — 94060 EVALUATION OF WHEEZING: CPT

## 2023-10-26 PROCEDURE — 99214 OFFICE O/P EST MOD 30 MIN: CPT | Mod: 25

## 2023-10-26 PROCEDURE — 94727 GAS DIL/WSHOT DETER LNG VOL: CPT

## 2023-10-26 PROCEDURE — 94729 DIFFUSING CAPACITY: CPT

## 2023-10-31 ENCOUNTER — RX RENEWAL (OUTPATIENT)
Age: 63
End: 2023-10-31

## 2023-10-31 ENCOUNTER — OUTPATIENT (OUTPATIENT)
Dept: OUTPATIENT SERVICES | Facility: HOSPITAL | Age: 63
LOS: 1 days | End: 2023-10-31
Payer: COMMERCIAL

## 2023-10-31 VITALS
SYSTOLIC BLOOD PRESSURE: 129 MMHG | RESPIRATION RATE: 16 BRPM | DIASTOLIC BLOOD PRESSURE: 75 MMHG | TEMPERATURE: 98 F | WEIGHT: 154.1 LBS | HEART RATE: 61 BPM | OXYGEN SATURATION: 99 % | HEIGHT: 59 IN

## 2023-10-31 DIAGNOSIS — Z01.818 ENCOUNTER FOR OTHER PREPROCEDURAL EXAMINATION: ICD-10-CM

## 2023-10-31 DIAGNOSIS — Z98.891 HISTORY OF UTERINE SCAR FROM PREVIOUS SURGERY: Chronic | ICD-10-CM

## 2023-10-31 DIAGNOSIS — R73.03 PREDIABETES: ICD-10-CM

## 2023-10-31 DIAGNOSIS — N83.209 UNSPECIFIED OVARIAN CYST, UNSPECIFIED SIDE: ICD-10-CM

## 2023-10-31 DIAGNOSIS — Z90.12 ACQUIRED ABSENCE OF LEFT BREAST AND NIPPLE: Chronic | ICD-10-CM

## 2023-10-31 DIAGNOSIS — G47.33 OBSTRUCTIVE SLEEP APNEA (ADULT) (PEDIATRIC): ICD-10-CM

## 2023-10-31 DIAGNOSIS — Z98.890 OTHER SPECIFIED POSTPROCEDURAL STATES: Chronic | ICD-10-CM

## 2023-10-31 LAB
A1C WITH ESTIMATED AVERAGE GLUCOSE RESULT: 5.7 % — HIGH (ref 4–5.6)
A1C WITH ESTIMATED AVERAGE GLUCOSE RESULT: 5.7 % — HIGH (ref 4–5.6)
ANION GAP SERPL CALC-SCNC: 13 MMOL/L — SIGNIFICANT CHANGE UP (ref 5–17)
ANION GAP SERPL CALC-SCNC: 13 MMOL/L — SIGNIFICANT CHANGE UP (ref 5–17)
BLD GP AB SCN SERPL QL: NEGATIVE — SIGNIFICANT CHANGE UP
BLD GP AB SCN SERPL QL: NEGATIVE — SIGNIFICANT CHANGE UP
BUN SERPL-MCNC: 13 MG/DL — SIGNIFICANT CHANGE UP (ref 7–23)
BUN SERPL-MCNC: 13 MG/DL — SIGNIFICANT CHANGE UP (ref 7–23)
CALCIUM SERPL-MCNC: 9.8 MG/DL — SIGNIFICANT CHANGE UP (ref 8.4–10.5)
CALCIUM SERPL-MCNC: 9.8 MG/DL — SIGNIFICANT CHANGE UP (ref 8.4–10.5)
CHLORIDE SERPL-SCNC: 107 MMOL/L — SIGNIFICANT CHANGE UP (ref 96–108)
CHLORIDE SERPL-SCNC: 107 MMOL/L — SIGNIFICANT CHANGE UP (ref 96–108)
CO2 SERPL-SCNC: 24 MMOL/L — SIGNIFICANT CHANGE UP (ref 22–31)
CO2 SERPL-SCNC: 24 MMOL/L — SIGNIFICANT CHANGE UP (ref 22–31)
CREAT SERPL-MCNC: 0.52 MG/DL — SIGNIFICANT CHANGE UP (ref 0.5–1.3)
CREAT SERPL-MCNC: 0.52 MG/DL — SIGNIFICANT CHANGE UP (ref 0.5–1.3)
EGFR: 104 ML/MIN/1.73M2 — SIGNIFICANT CHANGE UP
EGFR: 104 ML/MIN/1.73M2 — SIGNIFICANT CHANGE UP
ESTIMATED AVERAGE GLUCOSE: 117 MG/DL — HIGH (ref 68–114)
ESTIMATED AVERAGE GLUCOSE: 117 MG/DL — HIGH (ref 68–114)
GLUCOSE SERPL-MCNC: 97 MG/DL — SIGNIFICANT CHANGE UP (ref 70–99)
GLUCOSE SERPL-MCNC: 97 MG/DL — SIGNIFICANT CHANGE UP (ref 70–99)
HCT VFR BLD CALC: 37.9 % — SIGNIFICANT CHANGE UP (ref 34.5–45)
HCT VFR BLD CALC: 37.9 % — SIGNIFICANT CHANGE UP (ref 34.5–45)
HGB BLD-MCNC: 12.8 G/DL — SIGNIFICANT CHANGE UP (ref 11.5–15.5)
HGB BLD-MCNC: 12.8 G/DL — SIGNIFICANT CHANGE UP (ref 11.5–15.5)
MCHC RBC-ENTMCNC: 28.4 PG — SIGNIFICANT CHANGE UP (ref 27–34)
MCHC RBC-ENTMCNC: 28.4 PG — SIGNIFICANT CHANGE UP (ref 27–34)
MCHC RBC-ENTMCNC: 33.8 GM/DL — SIGNIFICANT CHANGE UP (ref 32–36)
MCHC RBC-ENTMCNC: 33.8 GM/DL — SIGNIFICANT CHANGE UP (ref 32–36)
MCV RBC AUTO: 84.2 FL — SIGNIFICANT CHANGE UP (ref 80–100)
MCV RBC AUTO: 84.2 FL — SIGNIFICANT CHANGE UP (ref 80–100)
NRBC # BLD: 0 /100 WBCS — SIGNIFICANT CHANGE UP (ref 0–0)
NRBC # BLD: 0 /100 WBCS — SIGNIFICANT CHANGE UP (ref 0–0)
PLATELET # BLD AUTO: 199 K/UL — SIGNIFICANT CHANGE UP (ref 150–400)
PLATELET # BLD AUTO: 199 K/UL — SIGNIFICANT CHANGE UP (ref 150–400)
POTASSIUM SERPL-MCNC: 3.4 MMOL/L — LOW (ref 3.5–5.3)
POTASSIUM SERPL-MCNC: 3.4 MMOL/L — LOW (ref 3.5–5.3)
POTASSIUM SERPL-SCNC: 3.4 MMOL/L — LOW (ref 3.5–5.3)
POTASSIUM SERPL-SCNC: 3.4 MMOL/L — LOW (ref 3.5–5.3)
RBC # BLD: 4.5 M/UL — SIGNIFICANT CHANGE UP (ref 3.8–5.2)
RBC # BLD: 4.5 M/UL — SIGNIFICANT CHANGE UP (ref 3.8–5.2)
RBC # FLD: 13.2 % — SIGNIFICANT CHANGE UP (ref 10.3–14.5)
RBC # FLD: 13.2 % — SIGNIFICANT CHANGE UP (ref 10.3–14.5)
RH IG SCN BLD-IMP: POSITIVE — SIGNIFICANT CHANGE UP
RH IG SCN BLD-IMP: POSITIVE — SIGNIFICANT CHANGE UP
SODIUM SERPL-SCNC: 144 MMOL/L — SIGNIFICANT CHANGE UP (ref 135–145)
SODIUM SERPL-SCNC: 144 MMOL/L — SIGNIFICANT CHANGE UP (ref 135–145)
WBC # BLD: 7.65 K/UL — SIGNIFICANT CHANGE UP (ref 3.8–10.5)
WBC # BLD: 7.65 K/UL — SIGNIFICANT CHANGE UP (ref 3.8–10.5)
WBC # FLD AUTO: 7.65 K/UL — SIGNIFICANT CHANGE UP (ref 3.8–10.5)
WBC # FLD AUTO: 7.65 K/UL — SIGNIFICANT CHANGE UP (ref 3.8–10.5)

## 2023-10-31 PROCEDURE — 86900 BLOOD TYPING SEROLOGIC ABO: CPT

## 2023-10-31 PROCEDURE — G0463: CPT

## 2023-10-31 PROCEDURE — 85027 COMPLETE CBC AUTOMATED: CPT

## 2023-10-31 PROCEDURE — 86850 RBC ANTIBODY SCREEN: CPT

## 2023-10-31 PROCEDURE — 36415 COLL VENOUS BLD VENIPUNCTURE: CPT

## 2023-10-31 PROCEDURE — 80048 BASIC METABOLIC PNL TOTAL CA: CPT

## 2023-10-31 PROCEDURE — 83036 HEMOGLOBIN GLYCOSYLATED A1C: CPT

## 2023-10-31 PROCEDURE — 86901 BLOOD TYPING SEROLOGIC RH(D): CPT

## 2023-10-31 RX ORDER — SODIUM CHLORIDE 9 MG/ML
3 INJECTION INTRAMUSCULAR; INTRAVENOUS; SUBCUTANEOUS EVERY 8 HOURS
Refills: 0 | Status: DISCONTINUED | OUTPATIENT
Start: 2023-11-21 | End: 2023-12-05

## 2023-10-31 RX ORDER — SODIUM CHLORIDE 9 MG/ML
1000 INJECTION, SOLUTION INTRAVENOUS
Refills: 0 | Status: DISCONTINUED | OUTPATIENT
Start: 2023-11-21 | End: 2023-12-05

## 2023-10-31 NOTE — H&P PST ADULT - ASSESSMENT
DASI score: 5.62 METS   DASI activity: Walking, occasionally uses cane, mild-mod house chores  Loose teeth or denture: No loose teeth or dentures  Mallampati: Class 1

## 2023-10-31 NOTE — H&P PST ADULT - NSICDXPASTMEDICALHX_GEN_ALL_CORE_FT
PAST MEDICAL HISTORY:  Asthma, mild     Breast cancer     H/O osteopenia     HTN (hypertension)     Hyperlipidemia     Hypothyroidism     Ovarian cyst     Prediabetes     Renal cyst, left     Urinary, incontinence, stress female     Vitamin D deficiency

## 2023-10-31 NOTE — H&P PST ADULT - PROBLEM SELECTOR PLAN 1
Scheduled for Laparoscopic Bilateral Salpingo-Oophorectomy   Pre-op labs obtained. PST instructions provided. Surgical soap given to patient. Patient verbalized understanding of instructions    Aspirin Last dose 11/13/2023  Advised Hold Multivitamins and Diclofenac 1 week prior to procedure

## 2023-10-31 NOTE — H&P PST ADULT - NSICDXPASTSURGICALHX_GEN_ALL_CORE_FT
PAST SURGICAL HISTORY:  H/O left mastectomy     H/O:       PAST SURGICAL HISTORY:  H/O left mastectomy     H/O:      S/P lumpectomy, left breast

## 2023-10-31 NOTE — H&P PST ADULT - HISTORY OF PRESENT ILLNESS
64 y/o F with PMHx significant for Breast Cancer s/p Chemotherapy/Radiation (1998) L Lumpectomy/ L Mastectomy (2002), Asthma - controlled, HILDA on CPAP, Pre-Diabetes using Ozempic reports experiencing symptoms of abdominal distention, intermittent right lower abdominal pain, stress urinary incontinence over the past few months. She had pelvic MRI 05/24/2023 which demonstrated right ovarian cyst ~ 8x6cm. She is scheduled for Laparoscopic Bilateral Salpingo-Oophorectomy with Dr. Landers on 11/21/2023.

## 2023-10-31 NOTE — H&P PST ADULT - ATTENDING COMMENTS
pt seen and plan discussed. to proceed with scheduled surgery. all questions answered. informed consent signed and witnessed.     skye negron

## 2023-11-07 PROBLEM — E78.5 HYPERLIPIDEMIA, UNSPECIFIED: Chronic | Status: ACTIVE | Noted: 2023-10-31

## 2023-11-07 PROBLEM — E55.9 VITAMIN D DEFICIENCY, UNSPECIFIED: Chronic | Status: ACTIVE | Noted: 2023-10-31

## 2023-11-07 PROBLEM — N39.3 STRESS INCONTINENCE (FEMALE) (MALE): Chronic | Status: ACTIVE | Noted: 2023-10-31

## 2023-11-07 PROBLEM — N83.209 UNSPECIFIED OVARIAN CYST, UNSPECIFIED SIDE: Chronic | Status: ACTIVE | Noted: 2023-10-31

## 2023-11-07 PROBLEM — Z87.39 PERSONAL HISTORY OF OTHER DISEASES OF THE MUSCULOSKELETAL SYSTEM AND CONNECTIVE TISSUE: Chronic | Status: ACTIVE | Noted: 2023-10-31

## 2023-11-07 PROBLEM — N28.1 CYST OF KIDNEY, ACQUIRED: Chronic | Status: ACTIVE | Noted: 2023-10-31

## 2023-11-07 PROBLEM — C50.919 MALIGNANT NEOPLASM OF UNSPECIFIED SITE OF UNSPECIFIED FEMALE BREAST: Chronic | Status: ACTIVE | Noted: 2023-10-31

## 2023-11-07 PROBLEM — I10 ESSENTIAL (PRIMARY) HYPERTENSION: Chronic | Status: ACTIVE | Noted: 2023-10-31

## 2023-11-07 PROBLEM — E03.9 HYPOTHYROIDISM, UNSPECIFIED: Chronic | Status: ACTIVE | Noted: 2023-10-31

## 2023-11-07 PROBLEM — R73.03 PREDIABETES: Chronic | Status: ACTIVE | Noted: 2023-10-31

## 2023-11-07 PROBLEM — J45.909 UNSPECIFIED ASTHMA, UNCOMPLICATED: Chronic | Status: ACTIVE | Noted: 2023-10-31

## 2023-11-20 ENCOUNTER — TRANSCRIPTION ENCOUNTER (OUTPATIENT)
Age: 63
End: 2023-11-20

## 2023-11-21 ENCOUNTER — RESULT REVIEW (OUTPATIENT)
Age: 63
End: 2023-11-21

## 2023-11-21 ENCOUNTER — TRANSCRIPTION ENCOUNTER (OUTPATIENT)
Age: 63
End: 2023-11-21

## 2023-11-21 ENCOUNTER — OUTPATIENT (OUTPATIENT)
Dept: OUTPATIENT SERVICES | Facility: HOSPITAL | Age: 63
LOS: 1 days | End: 2023-11-21
Payer: COMMERCIAL

## 2023-11-21 ENCOUNTER — APPOINTMENT (OUTPATIENT)
Dept: OBGYN | Facility: HOSPITAL | Age: 63
End: 2023-11-21

## 2023-11-21 VITALS
DIASTOLIC BLOOD PRESSURE: 58 MMHG | RESPIRATION RATE: 18 BRPM | OXYGEN SATURATION: 98 % | HEART RATE: 74 BPM | SYSTOLIC BLOOD PRESSURE: 117 MMHG

## 2023-11-21 VITALS
HEIGHT: 59 IN | HEART RATE: 63 BPM | OXYGEN SATURATION: 100 % | WEIGHT: 154.1 LBS | RESPIRATION RATE: 16 BRPM | SYSTOLIC BLOOD PRESSURE: 146 MMHG | TEMPERATURE: 97 F | DIASTOLIC BLOOD PRESSURE: 84 MMHG

## 2023-11-21 DIAGNOSIS — N83.209 UNSPECIFIED OVARIAN CYST, UNSPECIFIED SIDE: ICD-10-CM

## 2023-11-21 DIAGNOSIS — Z98.890 OTHER SPECIFIED POSTPROCEDURAL STATES: Chronic | ICD-10-CM

## 2023-11-21 DIAGNOSIS — Z90.12 ACQUIRED ABSENCE OF LEFT BREAST AND NIPPLE: Chronic | ICD-10-CM

## 2023-11-21 DIAGNOSIS — Z98.891 HISTORY OF UTERINE SCAR FROM PREVIOUS SURGERY: Chronic | ICD-10-CM

## 2023-11-21 LAB
GLUCOSE BLDC GLUCOMTR-MCNC: 107 MG/DL — HIGH (ref 70–99)
GLUCOSE BLDC GLUCOMTR-MCNC: 107 MG/DL — HIGH (ref 70–99)
RH IG SCN BLD-IMP: POSITIVE — SIGNIFICANT CHANGE UP
RH IG SCN BLD-IMP: POSITIVE — SIGNIFICANT CHANGE UP

## 2023-11-21 PROCEDURE — 82962 GLUCOSE BLOOD TEST: CPT

## 2023-11-21 PROCEDURE — C9399: CPT

## 2023-11-21 PROCEDURE — C1889: CPT

## 2023-11-21 PROCEDURE — 58661 LAPAROSCOPY REMOVE ADNEXA: CPT

## 2023-11-21 PROCEDURE — 88305 TISSUE EXAM BY PATHOLOGIST: CPT

## 2023-11-21 PROCEDURE — 88305 TISSUE EXAM BY PATHOLOGIST: CPT | Mod: 26

## 2023-11-21 DEVICE — SURGICEL POWDER 3 GRAMS
Type: IMPLANTABLE DEVICE | Status: NON-FUNCTIONAL
Removed: 2023-11-21

## 2023-11-21 RX ORDER — OXYCODONE HYDROCHLORIDE 5 MG/1
1 TABLET ORAL
Qty: 5 | Refills: 0
Start: 2023-11-21

## 2023-11-21 RX ORDER — MONTELUKAST 4 MG/1
1 TABLET, CHEWABLE ORAL
Refills: 0 | DISCHARGE

## 2023-11-21 RX ORDER — HYDROMORPHONE HYDROCHLORIDE 2 MG/ML
0.5 INJECTION INTRAMUSCULAR; INTRAVENOUS; SUBCUTANEOUS
Refills: 0 | Status: DISCONTINUED | OUTPATIENT
Start: 2023-11-21 | End: 2023-11-21

## 2023-11-21 RX ORDER — ACETAMINOPHEN 500 MG
1000 TABLET ORAL ONCE
Refills: 0 | Status: COMPLETED | OUTPATIENT
Start: 2023-11-21 | End: 2023-11-21

## 2023-11-21 RX ORDER — ATORVASTATIN CALCIUM 80 MG/1
1 TABLET, FILM COATED ORAL
Refills: 0 | DISCHARGE

## 2023-11-21 RX ORDER — CELECOXIB 200 MG/1
400 CAPSULE ORAL ONCE
Refills: 0 | Status: COMPLETED | OUTPATIENT
Start: 2023-11-21 | End: 2023-11-21

## 2023-11-21 RX ORDER — SCOPALAMINE 1 MG/3D
1 PATCH, EXTENDED RELEASE TRANSDERMAL ONCE
Refills: 0 | Status: COMPLETED | OUTPATIENT
Start: 2023-11-21 | End: 2023-11-21

## 2023-11-21 RX ORDER — SEMAGLUTIDE 0.68 MG/ML
0.5 INJECTION, SOLUTION SUBCUTANEOUS
Refills: 0 | DISCHARGE

## 2023-11-21 RX ORDER — ASPIRIN/CALCIUM CARB/MAGNESIUM 324 MG
1 TABLET ORAL
Refills: 0 | DISCHARGE

## 2023-11-21 RX ORDER — DICLOFENAC SODIUM 75 MG/1
1 TABLET, DELAYED RELEASE ORAL
Refills: 0 | DISCHARGE

## 2023-11-21 RX ORDER — AMLODIPINE BESYLATE 2.5 MG/1
1 TABLET ORAL
Refills: 0 | DISCHARGE

## 2023-11-21 RX ORDER — CHLORHEXIDINE GLUCONATE 213 G/1000ML
1 SOLUTION TOPICAL ONCE
Refills: 0 | Status: COMPLETED | OUTPATIENT
Start: 2023-11-21 | End: 2023-11-21

## 2023-11-21 RX ORDER — LIDOCAINE HCL 20 MG/ML
0.2 VIAL (ML) INJECTION ONCE
Refills: 0 | Status: COMPLETED | OUTPATIENT
Start: 2023-11-21 | End: 2023-11-21

## 2023-11-21 RX ORDER — CEFOTETAN DISODIUM 1 G
2 VIAL (EA) INJECTION ONCE
Refills: 0 | Status: COMPLETED | OUTPATIENT
Start: 2023-11-21 | End: 2023-11-21

## 2023-11-21 RX ORDER — CHOLECALCIFEROL (VITAMIN D3) 125 MCG
1 CAPSULE ORAL
Refills: 0 | DISCHARGE

## 2023-11-21 RX ORDER — METOPROLOL TARTRATE 50 MG
1 TABLET ORAL
Refills: 0 | DISCHARGE

## 2023-11-21 RX ORDER — OXYCODONE HYDROCHLORIDE 5 MG/1
5 TABLET ORAL ONCE
Refills: 0 | Status: DISCONTINUED | OUTPATIENT
Start: 2023-11-21 | End: 2023-11-21

## 2023-11-21 RX ORDER — GABAPENTIN 400 MG/1
300 CAPSULE ORAL ONCE
Refills: 0 | Status: COMPLETED | OUTPATIENT
Start: 2023-11-21 | End: 2023-11-21

## 2023-11-21 RX ORDER — LETROZOLE 2.5 MG/1
1 TABLET, FILM COATED ORAL
Refills: 0 | DISCHARGE

## 2023-11-21 RX ORDER — ASCORBIC ACID 60 MG
1 TABLET,CHEWABLE ORAL
Refills: 0 | DISCHARGE

## 2023-11-21 RX ORDER — ONDANSETRON 8 MG/1
4 TABLET, FILM COATED ORAL
Refills: 0 | Status: DISCONTINUED | OUTPATIENT
Start: 2023-11-21 | End: 2023-12-05

## 2023-11-21 RX ORDER — LEVOTHYROXINE SODIUM 125 MCG
1 TABLET ORAL
Refills: 0 | DISCHARGE

## 2023-11-21 RX ADMIN — CELECOXIB 400 MILLIGRAM(S): 200 CAPSULE ORAL at 07:17

## 2023-11-21 RX ADMIN — SCOPALAMINE 1 PATCH: 1 PATCH, EXTENDED RELEASE TRANSDERMAL at 16:03

## 2023-11-21 RX ADMIN — ONDANSETRON 4 MILLIGRAM(S): 8 TABLET, FILM COATED ORAL at 15:36

## 2023-11-21 RX ADMIN — SODIUM CHLORIDE 100 MILLILITER(S): 9 INJECTION, SOLUTION INTRAVENOUS at 07:15

## 2023-11-21 RX ADMIN — Medication 1000 MILLIGRAM(S): at 07:16

## 2023-11-21 RX ADMIN — CHLORHEXIDINE GLUCONATE 1 APPLICATION(S): 213 SOLUTION TOPICAL at 07:16

## 2023-11-21 RX ADMIN — GABAPENTIN 300 MILLIGRAM(S): 400 CAPSULE ORAL at 07:16

## 2023-11-21 RX ADMIN — SODIUM CHLORIDE 3 MILLILITER(S): 9 INJECTION INTRAMUSCULAR; INTRAVENOUS; SUBCUTANEOUS at 07:14

## 2023-11-21 RX ADMIN — Medication 400 MILLIGRAM(S): at 14:39

## 2023-11-21 NOTE — ASU DISCHARGE PLAN (ADULT/PEDIATRIC) - MEDICATION INSTRUCTIONS
You may take Ibuprofen 600mg every 6 hours and Tylenol 650mg every 6 hours. You can stagger these medications so that every 3 hours you take one or the other. Oxycodone 5mg tablets x5 sent to your pharmacy to use as needed for severe pain not well controlled with Tylenol/Motrin.

## 2023-11-21 NOTE — BRIEF OPERATIVE NOTE - OPERATION/FINDINGS
EUA revealed normal appearing external genitalia and cervix, difficult to palpate uterus. Laparoscopic survey revealed omental adhesions present to the anterior abdominal wall. Anterior surface of uterus adherent to anterior abdominal wall. ~8cm right ovarian simple cyst removed intact and drained for clear fluid, right fallopian tube stretched over surface. Otherwise normal appearing fallopian tubes and ovaries. Bilateral ureters noted to be vermiculating in pelvis at the level of the pelvic brim and traced along each respective course through the ureteric tunnel. Excellent hemostasis noted at the close of the case with assistance of Surgicel powder

## 2023-11-21 NOTE — CHART NOTE - NSCHARTNOTEFT_GEN_A_CORE
Patient is having some nausea, dizziness, and one episode of vomiting. Ordered transdermal scopolamine patch. Anticipate ASU discharge today if symptoms improve with patch.    Discussed with Hilda Doe PGY1

## 2023-11-21 NOTE — ASU PATIENT PROFILE, ADULT - FALL HARM RISK - UNIVERSAL INTERVENTIONS
Bed in lowest position, wheels locked, appropriate side rails in place/Call bell, personal items and telephone in reach/Instruct patient to call for assistance before getting out of bed or chair/Non-slip footwear when patient is out of bed/[place] to call system/Physically safe environment - no spills, clutter or unnecessary equipment/Purposeful Proactive Rounding/Room/bathroom lighting operational, light cord in reach

## 2023-11-21 NOTE — ASU DISCHARGE PLAN (ADULT/PEDIATRIC) - ASU DC SPECIAL INSTRUCTIONSFT
Regular activity as tolerated, though no exercise or heavy lifting for 4 weeks. Nothing per vagina for 4 weeks: no tampons, intercourse, or douching. Follow up with Dr. Landers as scheduled 12/6/23 @345pm.

## 2023-11-21 NOTE — ASU DISCHARGE PLAN (ADULT/PEDIATRIC) - NURSING INSTRUCTIONS
OK to take Tylenol/Acetaminophen at ANY TIME AFTER 1:15 PM TODAY for pain and every 6 hours after as needed. OK to take Motrin/Ibuprofen at 4:30 PM TODAY for pain and every 6 hours after as needed.

## 2023-11-21 NOTE — PROGRESS NOTE ADULT - ASSESSMENT
62yo F s/p uncomplicated [facility] BSO. Recovering well and in stable condition.    - Due to void by 7pm  - plan for ASU discharge      Hilda Hernandez PGY1 64yo F s/p uncomplicated [facility] BSO. Recovering well and in stable condition.    - Due to void by 7pm  - plan for ASU discharge    Hilda Hernandez PGY1

## 2023-11-21 NOTE — ASU PREOP CHECKLIST - COMMENTS
Chief Complaint   Patient presents with    Hypertension     6 mo f/u         Subjective     Ash Licona is a 54 y.o. male being seen for a follow up appointment today regarding HTN. He is on losartan 100mg daily and HCTZ 25mg daily for HTN. He has had normal BP readings at other provider offices, reported to be normal. He is having difficultly with taking the HCTZ due to urinary frequency.     He has been taking Vit D supplements and wonders of he needs all the supplements that he is currently taking. He did not bring them all in.    He is having witnessed apnea and difficulty sleeping. He is a loud snorer and wakes himself up gasping for air. . His watch shows only 6 hrs of sleep at night.     Hypertension  Pertinent negatives include no chest pain or palpitations.        Allergies   Allergen Reactions    Iodine Hives and Unknown - Low Severity         Current Outpatient Medications:     ADVAIR DISKUS 100-50 MCG/DOSE DISKUS, INL 1 PUFF PO BID, Disp: , Rfl: 2    albuterol (PROVENTIL HFA;VENTOLIN HFA) 108 (90 BASE) MCG/ACT inhaler, Inhale 2 puffs Every 4 (Four) Hours As Needed for Wheezing or Shortness of Air., Disp: , Rfl:     hydroCHLOROthiazide (HYDRODIURIL) 25 MG tablet, Take 1 tablet by mouth Daily., Disp: 90 tablet, Rfl: 0    losartan (COZAAR) 100 MG tablet, Take 1 tablet by mouth Daily., Disp: 90 tablet, Rfl: 0    montelukast (SINGULAIR) 10 MG tablet, Take  by mouth., Disp: , Rfl:     Testosterone Cypionate (DEPOTESTOTERONE CYPIONATE) 200 MG/ML injection, , Disp: , Rfl:     The following portions of the patient's history were reviewed and updated as appropriate: allergies, current medications, past family history, past medical history, past social history, past surgical history, and problem list.    Review of Systems   Constitutional: Negative.    HENT: Negative.     Eyes: Negative.    Respiratory: Negative.     Cardiovascular: Negative.  Negative for chest pain, palpitations and leg swelling.    Gastrointestinal: Negative.    Musculoskeletal: Negative.    Hematological: Negative.    Psychiatric/Behavioral:  Positive for sleep disturbance.    All other systems reviewed and are negative.      Assessment     Physical Exam  Vitals reviewed.   Constitutional:       Appearance: Normal appearance.   HENT:      Head: Normocephalic.      Mouth/Throat:      Mouth: Mucous membranes are moist. No oral lesions.      Comments: Large tongue and uvula  Cardiovascular:      Pulses: Normal pulses.      Heart sounds: Normal heart sounds. No murmur heard.  Pulmonary:      Effort: Pulmonary effort is normal. No respiratory distress.      Breath sounds: Normal breath sounds. No stridor.   Skin:     General: Skin is warm and dry.   Neurological:      Mental Status: He is alert and oriented to person, place, and time.   Psychiatric:         Mood and Affect: Mood normal.         Behavior: Behavior normal.         Thought Content: Thought content normal.         Plan         Diagnoses and all orders for this visit:    1. Primary hypertension (Primary)  Comments:  Bp poorlt controlled and also HCTZ intolerant. Stop Losartan 100mg and HCTZ 25mg. Start Exforge 320/5mg daily. Keep a BP log  Orders:  -     amLODIPine-valsartan (Exforge) 5-320 MG per tablet; Take 1 tablet by mouth Daily.  Dispense: 90 tablet; Refill: 1  -     Vitamin D,25-Hydroxy  -     Comprehensive metabolic panel  -     Lipid Panel With LDL/HDL Ratio    2. Witnessed apneic spells  -     Ambulatory Referral to Sleep Medicine    3. Snoring  -     Ambulatory Referral to Sleep Medicine    4. Takes dietary supplements  Comments:  Bring supplements to next visit, screenign today for Vit D toxicity. Reduce Vit D to 1000iu daily  Orders:  -     Vitamin D,25-Hydroxy        Discussed risk of uncontrolled sleep apnea for a fib and CHF.    Follow up in 4 weeks   8 0z  of water with med 0400 til 0500

## 2023-11-21 NOTE — PROGRESS NOTE ADULT - SUBJECTIVE AND OBJECTIVE BOX
Patient seen and examined at bedside, recently post-op from [facility] BSO. No acute complaints at this time. She is feeling drowsy and has appropriate pain at incision site. Has not yet gotten out of bed. Due to void by 7pm. Denies CP, SOB, N/V, fevers, and chills.        Vital Signs Last 24 Hours  T(C): 36.2 (11-21-23 @ 12:37), Max: 36.6 (11-21-23 @ 11:07)  HR: 60 (11-21-23 @ 12:37) (58 - 71)  BP: 107/59 (11-21-23 @ 12:37) (97/50 - 146/84)  RR: 12 (11-21-23 @ 12:37) (12 - 22)  SpO2: 97% (11-21-23 @ 12:37) (95% - 100%)    I&O's Summary    21 Nov 2023 07:01  -  21 Nov 2023 13:08  --------------------------------------------------------  IN: 1350 mL / OUT: 120 mL / NET: 1230 mL        Physical Exam:  General: NAD  CV: NR, RR, S1, S2, no M/R/G  Lungs: CTA-B  Abdomen: Soft, appropriately tender, non-distended, +BS  Ext: No pain or swelling    Labs:      MEDICATIONS  (STANDING):  lactated ringers. 1000 milliLiter(s) (100 mL/Hr) IV Continuous <Continuous>  sodium chloride 0.9% lock flush 3 milliLiter(s) IV Push every 8 hours    MEDICATIONS  (PRN):  HYDROmorphone  Injectable 0.5 milliGRAM(s) IV Push every 10 minutes PRN Severe Pain (7 -10)  ondansetron Injectable 4 milliGRAM(s) IV Push every 30 minutes PRN Nausea and/or Vomiting  oxyCODONE    IR 5 milliGRAM(s) Oral once PRN Moderate Pain (4 - 6)      [Age] yo s/p [Procedure] recovering well in acute post-operative state.    Neuro: Pain controlled. PO pain meds vs. PCA  CV: Hemodynamically stable  Pulm: Encourage oob/ambulation, incentive spirometer at bedside  GI: Regular Diet vs. NPO vs. Advance diet as tolerated  : Bauman in place vs. voiding spontaneously vs. DTV@  Heme: HSQ and SCDs for DVT PPX  ID: afebrile  Endo: no active issues  Dispo: continue routine post-op care     HIRA Albarran  d/w GYN team     Patient seen and examined at bedside, recently [place]-op [place] [facility] [place]. No acute complaints at this time. She is feeling drowsy and has appropriate pain at incision site. Has not yet gotten out of bed. Due to void by 7pm. Denies CP, SOB, N/V, fevers, and chills.    Vital Signs Last 24 Hours  T(C): 36.2 (11-21-23 @ 12:37), Max: 36.6 (11-21-23 @ 11:07)  HR: 60 (11-21-23 @ 12:37) (58 - 71)  BP: 107/59 (11-21-23 @ 12:37) (97/50 - 146/84)  RR: 12 (11-21-23 @ 12:37) (12 - 22)  SpO2: 97% (11-21-23 @ 12:37) (95% - 100%)    I&O's Summary    21 Nov 2023 07:01  -  21 Nov 2023 13:08  --------------------------------------------------------  IN: 1350 mL / OUT: 120 mL / NET: 1230 mL      Physical Exam:  General: NAD  CV: NR, RR, S1, S2, no M/R/G  Lungs: CTA-B  Abdomen: Soft, appropriately tender, non-distended, +BS  Skin: 3 [facility] port sites c/d/i with subcuticular closure and overlying dermabond  Ext: No pain or swelling    MEDICATIONS  (STANDING):  lactated ringers. 1000 milliLiter(s) (100 mL/Hr) IV Continuous <Continuous>  sodium chloride 0.9% lock flush 3 milliLiter(s) IV Push every 8 hours    MEDICATIONS  (PRN):  HYDROmorphone  Injectable 0.5 milliGRAM(s) IV Push every 10 minutes PRN Severe Pain (7 -10)  ondansetron Injectable 4 milliGRAM(s) IV Push every 30 minutes PRN Nausea and/or Vomiting  oxyCODONE    IR 5 milliGRAM(s) Oral once PRN Moderate Pain (4 - 6)

## 2023-11-21 NOTE — ASU DISCHARGE PLAN (ADULT/PEDIATRIC) - CARE PROVIDER_API CALL
Marcy Landers  Obstetrics and Gynecology  [address] [place] [address], [address]  [place] [address]  Phone: [phone]  Fax: [phone]  Follow Up Time:

## 2023-11-29 ENCOUNTER — APPOINTMENT (OUTPATIENT)
Dept: PULMONOLOGY | Facility: CLINIC | Age: 63
End: 2023-11-29

## 2023-11-29 LAB
SURGICAL PATHOLOGY STUDY: SIGNIFICANT CHANGE UP
SURGICAL PATHOLOGY STUDY: SIGNIFICANT CHANGE UP

## 2023-12-06 ENCOUNTER — APPOINTMENT (OUTPATIENT)
Dept: OBGYN | Facility: CLINIC | Age: 63
End: 2023-12-06
Payer: COMMERCIAL

## 2023-12-06 VITALS
WEIGHT: 157 LBS | HEART RATE: 71 BPM | BODY MASS INDEX: 31.65 KG/M2 | HEIGHT: 59 IN | DIASTOLIC BLOOD PRESSURE: 77 MMHG | SYSTOLIC BLOOD PRESSURE: 120 MMHG

## 2023-12-06 PROCEDURE — 99024 POSTOP FOLLOW-UP VISIT: CPT

## 2023-12-14 ENCOUNTER — OUTPATIENT (OUTPATIENT)
Dept: OUTPATIENT SERVICES | Facility: HOSPITAL | Age: 63
LOS: 1 days | End: 2023-12-14
Payer: COMMERCIAL

## 2023-12-14 ENCOUNTER — APPOINTMENT (OUTPATIENT)
Dept: UROLOGY | Facility: CLINIC | Age: 63
End: 2023-12-14
Payer: COMMERCIAL

## 2023-12-14 DIAGNOSIS — R35.0 FREQUENCY OF MICTURITION: ICD-10-CM

## 2023-12-14 DIAGNOSIS — Z90.12 ACQUIRED ABSENCE OF LEFT BREAST AND NIPPLE: Chronic | ICD-10-CM

## 2023-12-14 DIAGNOSIS — Z98.891 HISTORY OF UTERINE SCAR FROM PREVIOUS SURGERY: Chronic | ICD-10-CM

## 2023-12-14 DIAGNOSIS — Z98.890 OTHER SPECIFIED POSTPROCEDURAL STATES: Chronic | ICD-10-CM

## 2023-12-14 PROCEDURE — 99213 OFFICE O/P EST LOW 20 MIN: CPT

## 2023-12-14 PROCEDURE — 76775 US EXAM ABDO BACK WALL LIM: CPT | Mod: 26

## 2023-12-14 PROCEDURE — 76775 US EXAM ABDO BACK WALL LIM: CPT

## 2023-12-15 DIAGNOSIS — N28.1 CYST OF KIDNEY, ACQUIRED: ICD-10-CM

## 2023-12-16 NOTE — LETTER BODY
[FreeTextEntry1] : Peyman Velasquez MD [id number] [facility] [address] [place] [id number] P [phone] F [phone]  Dear Dr. Velasquez,   Reason for visit: Abnormal imaging. Left renal cyst. Urge and stress incontinence.   This is a 63 year-old woman with abnormal abdominal imaging. Patient underwent MRI of Pelvis ordered by her gynecologist for right ovarian cyst. The MRI demonstrated incidental left renal lesion. Patient is here for follow-up ultrasound. Ultrasound showed stable complex renal cyst. Patient had recent bilateral oophorectomy for benign disease. Patient denies any flank pain, hematuria, or urinary difficulties. Patient admits having urge and stress urinary incontinences, aggravated by cough and sneezes. The patient denies any relieving factors. The patient denies any interference of function. All other review of systems are negative. She has breast cancer in her family medical history. She had surgical histories of C-sections and mastectomy with flap reconstruction. Past medical history, family history and social history were inquired and were noncontributory to current condition. The patient does not use tobacco or drink alcohol. Medications and allergies were reviewed. She has no known allergies to medication.    On examination, the patient is a well-appearing woman in no acute distress. She is alert and oriented follows commands. She has normal mood and affect. She is normocephalic. Neck is supple. Oral no thrush. Respirations are unlabored. Abdomen is soft and nontender. Bladder is nonpalpable. No CVA tenderness. Neurologically she is grossly intact. No peripheral edema. Skin without gross abnormality. Patient has right-sided meniscus tear. She is currently ambulating with a cane.    I personally reviewed ultrasound images with the patient today and images demonstrated stable complex renal cyst.    Assessment: Abnormal urinary imaging. Left renal cyst. Urge and stress incontinences.    I counseled the patient. Ultrasound images today demonstrated stable complex renal cyst. I reassured patient. Patient is scheduled for spine injection for chronic low back pain.. The risks and benefits were discussed. Alternatives were given. I answered the patient questions. The patient will follow-up as directed and will contact me with any questions or concerns. Thank you for the opportunity to participate in the care of Ms. MEDRANO. I will keep you updated on her progress.    Plan: Follow-up orthopedic surgery for spine injection. Follow up as directed.  Follow-up 1 year for renal ultrasound.

## 2023-12-16 NOTE — ADDENDUM
[FreeTextEntry1] : Entered by Neil Rivero, acting as scribe for Dr. Jose Lira. The documentation recorded by the scribe accurately reflects the service I personally performed and the decisions made by me.

## 2023-12-16 NOTE — HISTORY OF PRESENT ILLNESS
[FreeTextEntry1] : complex renal cyst US stable recent bilateral oophorectomy for benign disease  scheduled spine injection  Please refer to URO Consult note

## 2024-01-10 ENCOUNTER — APPOINTMENT (OUTPATIENT)
Dept: PULMONOLOGY | Facility: CLINIC | Age: 64
End: 2024-01-10

## 2024-04-28 ENCOUNTER — NON-APPOINTMENT (OUTPATIENT)
Age: 64
End: 2024-04-28

## 2024-04-29 ENCOUNTER — APPOINTMENT (OUTPATIENT)
Dept: PULMONOLOGY | Facility: CLINIC | Age: 64
End: 2024-04-29
Payer: COMMERCIAL

## 2024-04-29 VITALS
SYSTOLIC BLOOD PRESSURE: 128 MMHG | BODY MASS INDEX: 30.7 KG/M2 | DIASTOLIC BLOOD PRESSURE: 72 MMHG | TEMPERATURE: 97.9 F | WEIGHT: 152 LBS | OXYGEN SATURATION: 99 % | HEART RATE: 65 BPM

## 2024-04-29 DIAGNOSIS — G47.33 OBSTRUCTIVE SLEEP APNEA (ADULT) (PEDIATRIC): ICD-10-CM

## 2024-04-29 DIAGNOSIS — R05.9 COUGH, UNSPECIFIED: ICD-10-CM

## 2024-04-29 PROCEDURE — 99213 OFFICE O/P EST LOW 20 MIN: CPT

## 2024-04-29 RX ORDER — BUDESONIDE, GLYCOPYRROLATE, AND FORMOTEROL FUMARATE 160; 9; 4.8 UG/1; UG/1; UG/1
160-9-4.8 AEROSOL, METERED RESPIRATORY (INHALATION)
Qty: 3 | Refills: 1 | Status: ACTIVE | COMMUNITY
Start: 2024-04-29 | End: 1900-01-01

## 2024-04-29 RX ORDER — MONTELUKAST 10 MG/1
10 TABLET, FILM COATED ORAL
Qty: 90 | Refills: 0 | Status: COMPLETED | COMMUNITY
Start: 2022-11-21 | End: 2024-04-29

## 2024-04-29 RX ORDER — MONTELUKAST 10 MG/1
10 TABLET, FILM COATED ORAL
Qty: 1 | Refills: 1 | Status: ACTIVE | COMMUNITY
Start: 2024-04-29 | End: 1900-01-01

## 2024-04-29 RX ORDER — FLUTICASONE FUROATE AND VILANTEROL TRIFENATATE 100; 25 UG/1; UG/1
100-25 POWDER RESPIRATORY (INHALATION) DAILY
Qty: 60 | Refills: 2 | Status: COMPLETED | COMMUNITY
Start: 2022-10-10 | End: 2024-04-29

## 2024-04-29 NOTE — DISCUSSION/SUMMARY
[FreeTextEntry1] : She is a 63 year-old woman with a history of hypertension, hyperlipidemia, hypothyroidism, breast cancer and HILDA.  She had COVID in September 2022.  She never smoked.   Impression Cough and rhintis -reactive airways disease -improved with Breztri Obstructive sleep apnea -On CPAP  Recommend  Renew Breztri and montelukast  Follow up in 6 months

## 2024-04-29 NOTE — REVIEW OF SYSTEMS
[Wheezing] : wheezing [Thyroid Problem] : thyroid problem [Fever] : no fever [Fatigue] : no fatigue [Nasal Congestion] : no nasal congestion [Postnasal Drip] : no postnasal drip [Sinus Problems] : sinus problems [Cough] : no cough [Hemoptysis] : no hemoptysis [Dyspnea] : no dyspnea [Chest Discomfort] : no chest discomfort [Palpitations] : no palpitations [GERD] : no gerd [Myalgias] : no myalgias [Rash] : no rash [History of Iron Deficiency] : no history of iron deficiency [Headache] : no headache [Anxiety] : no anxiety [Diabetes] : no diabetes

## 2024-04-29 NOTE — PHYSICAL EXAM
[No Acute Distress] : no acute distress [Normal Appearance] : normal appearance [Normal Rate/Rhythm] : normal rate/rhythm [Normal S1, S2] : normal s1, s2 [No Resp Distress] : no resp distress [No Acc Muscle Use] : no acc muscle use [Clear to Auscultation Bilaterally] : clear to auscultation bilaterally [No Abnormalities] : no abnormalities [Benign] : benign [No HSM] : no hsm [Normal Gait] : normal gait [No Clubbing] : no clubbing [No Cyanosis] : no cyanosis [No Edema] : no edema [Normal Turgor] : normal turgor [No Focal Deficits] : no focal deficits [Oriented x3] : oriented x3 [Normal Affect] : normal affect [Normal Oropharynx] : normal oropharynx

## 2024-04-29 NOTE — PROCEDURE
[FreeTextEntry1] : Chest x-ray 10/7/22: Normal.   PFT 10/10/22: Spirometry was normal.  Lung volumes were normal.  Mild reduction in diffusion noted.  PFT 10/26/23: No obstruction.  Moderate restriction.  Moderate reduction in diffusion.  Mild improvement noted after inhalation of a bronchodilator.  CT Chest 1/11/23: No bronchiectasis.  No nodules.  No effusions.

## 2024-04-29 NOTE — HISTORY OF PRESENT ILLNESS
[Never] : never [Obstructive Sleep Apnea] : obstructive sleep apnea [TextBox_4] : She is a 63 year-old woman with a history of hypertension, hyperlipidemia, hypothyroidism, breast cancer and HILDA.  She had COVID in September 2022.  She never smoked. She uses CPAP at night.   Has been taking Breztri and montelukast.  -      [Difficulty Initiating Sleep] : does not have difficulty initiating sleep [Difficulty Maintaining Sleep] : does not have difficulty maintaining sleep [Fatigue] : no fatigue

## 2024-05-22 ENCOUNTER — RESULT REVIEW (OUTPATIENT)
Age: 64
End: 2024-05-22

## 2024-05-22 ENCOUNTER — APPOINTMENT (OUTPATIENT)
Dept: MAMMOGRAPHY | Facility: CLINIC | Age: 64
End: 2024-05-22
Payer: COMMERCIAL

## 2024-05-22 ENCOUNTER — OUTPATIENT (OUTPATIENT)
Dept: OUTPATIENT SERVICES | Facility: HOSPITAL | Age: 64
LOS: 1 days | End: 2024-05-22
Payer: COMMERCIAL

## 2024-05-22 ENCOUNTER — APPOINTMENT (OUTPATIENT)
Dept: ULTRASOUND IMAGING | Facility: CLINIC | Age: 64
End: 2024-05-22
Payer: COMMERCIAL

## 2024-05-22 DIAGNOSIS — Z98.890 OTHER SPECIFIED POSTPROCEDURAL STATES: Chronic | ICD-10-CM

## 2024-05-22 DIAGNOSIS — Z98.891 HISTORY OF UTERINE SCAR FROM PREVIOUS SURGERY: Chronic | ICD-10-CM

## 2024-05-22 DIAGNOSIS — Z00.8 ENCOUNTER FOR OTHER GENERAL EXAMINATION: ICD-10-CM

## 2024-05-22 DIAGNOSIS — Z90.12 ACQUIRED ABSENCE OF LEFT BREAST AND NIPPLE: Chronic | ICD-10-CM

## 2024-05-22 PROCEDURE — 76641 ULTRASOUND BREAST COMPLETE: CPT | Mod: 26,RT

## 2024-05-22 PROCEDURE — 77067 SCR MAMMO BI INCL CAD: CPT | Mod: 26,RT,52

## 2024-05-22 PROCEDURE — 77067 SCR MAMMO BI INCL CAD: CPT

## 2024-05-22 PROCEDURE — 76641 ULTRASOUND BREAST COMPLETE: CPT

## 2024-05-22 PROCEDURE — 77063 BREAST TOMOSYNTHESIS BI: CPT | Mod: 26,52

## 2024-05-22 PROCEDURE — 77063 BREAST TOMOSYNTHESIS BI: CPT

## 2024-07-05 ENCOUNTER — APPOINTMENT (OUTPATIENT)
Dept: SURGICAL ONCOLOGY | Facility: CLINIC | Age: 64
End: 2024-07-05

## 2024-09-20 ENCOUNTER — APPOINTMENT (OUTPATIENT)
Dept: SURGICAL ONCOLOGY | Facility: CLINIC | Age: 64
End: 2024-09-20
Payer: COMMERCIAL

## 2024-09-20 VITALS
SYSTOLIC BLOOD PRESSURE: 139 MMHG | WEIGHT: 152 LBS | OXYGEN SATURATION: 99 % | DIASTOLIC BLOOD PRESSURE: 79 MMHG | HEIGHT: 59 IN | BODY MASS INDEX: 30.64 KG/M2 | HEART RATE: 62 BPM

## 2024-09-20 DIAGNOSIS — C50.912 MALIGNANT NEOPLASM OF UNSPECIFIED SITE OF LEFT FEMALE BREAST: ICD-10-CM

## 2024-09-20 PROCEDURE — 99215 OFFICE O/P EST HI 40 MIN: CPT

## 2024-09-20 NOTE — HISTORY OF PRESENT ILLNESS
[de-identified] : Patient is a 65 y/o female who presents for a left breast cancer follow up. S/p L lumpectomy, chemo, XRT, PRATT 1998.  Recurrence 2002 - S/p L MTX, chemo, PRATT.  She was on AI since 2004 with Letrozole.   She had a bone density report showing osteopenia. She is on Fosamax for the last x5 years.   R MMG/US 5/22/24- No mammographic or sonographic evidence of malignancy. BIRADS 1  Pt was seen by GYN because she was found to have a 8 cm ovarian cyst and was seen by Dr. Bryan Landers in August 2023. S/p oophorectomy.    Right mammo/sono 4/28/23- BI-RADS 1  Right mammo/sono 3/14/22 - BI-RADS 2  Denies palpable lumps or nipple discharge.

## 2024-09-20 NOTE — HISTORY OF PRESENT ILLNESS
[de-identified] : Patient is a 65 y/o female who presents for a left breast cancer follow up. S/p L lumpectomy, chemo, XRT, PRATT 1998.  Recurrence 2002 - S/p L MTX, chemo, PRATT.  She was on AI since 2004 with Letrozole.   She had a bone density report showing osteopenia. She is on Fosamax for the last x5 years.   R MMG/US 5/22/24- No mammographic or sonographic evidence of malignancy. BIRADS 1  Pt was seen by GYN because she was found to have a 8 cm ovarian cyst and was seen by Dr. Bryan Landers in August 2023. S/p oophorectomy.    Right mammo/sono 4/28/23- BI-RADS 1  Right mammo/sono 3/14/22 - BI-RADS 2  Denies palpable lumps or nipple discharge.

## 2024-09-20 NOTE — PHYSICAL EXAM
[Normal] : supple, no neck mass and thyroid not enlarged [Normal Supraclavicular Lymph Nodes] : normal supraclavicular lymph nodes [Normal Axillary Lymph Nodes] : normal axillary lymph nodes [Normal] : oriented to person, place and time, with appropriate affect [de-identified] : reconstructed left breast well healed.  no masses or adenopathy bilaterally.

## 2024-09-20 NOTE — PHYSICAL EXAM
[Normal] : supple, no neck mass and thyroid not enlarged [Normal Supraclavicular Lymph Nodes] : normal supraclavicular lymph nodes [Normal Axillary Lymph Nodes] : normal axillary lymph nodes [Normal] : oriented to person, place and time, with appropriate affect [de-identified] : reconstructed left breast well healed.  no masses or adenopathy bilaterally.

## 2024-09-20 NOTE — ADDENDUM
[FreeTextEntry1] : I, Sarah Jarrett, acted solely as a scribe for Dr. Osman Inman on this date 09/20/2024.

## 2024-09-20 NOTE — ASSESSMENT
[FreeTextEntry1] : Recurrent left breast cancer JODI BI-RADS 1 right breast imaging 5/2024 Continue right breast imaging April 5/2025  Continue Letrozole as per medical oncology RTO 1 year

## 2024-10-07 ENCOUNTER — NON-APPOINTMENT (OUTPATIENT)
Age: 64
End: 2024-10-07

## 2024-10-07 ENCOUNTER — APPOINTMENT (OUTPATIENT)
Dept: PULMONOLOGY | Facility: CLINIC | Age: 64
End: 2024-10-07
Payer: COMMERCIAL

## 2024-10-07 VITALS
BODY MASS INDEX: 31.31 KG/M2 | HEART RATE: 107 BPM | SYSTOLIC BLOOD PRESSURE: 155 MMHG | WEIGHT: 155 LBS | TEMPERATURE: 97.7 F | OXYGEN SATURATION: 97 % | DIASTOLIC BLOOD PRESSURE: 90 MMHG

## 2024-10-07 DIAGNOSIS — Z86.16 PERSONAL HISTORY OF COVID-19: ICD-10-CM

## 2024-10-07 DIAGNOSIS — R05.9 COUGH, UNSPECIFIED: ICD-10-CM

## 2024-10-07 DIAGNOSIS — G47.33 OBSTRUCTIVE SLEEP APNEA (ADULT) (PEDIATRIC): ICD-10-CM

## 2024-10-07 DIAGNOSIS — Z87.09 PERSONAL HISTORY OF OTHER DISEASES OF THE RESPIRATORY SYSTEM: ICD-10-CM

## 2024-10-07 PROCEDURE — 99214 OFFICE O/P EST MOD 30 MIN: CPT

## 2024-10-07 RX ORDER — PHENTERMINE HYDROCHLORIDE 37.5 MG/1
CAPSULE ORAL
Refills: 0 | Status: ACTIVE | COMMUNITY

## 2024-10-07 RX ORDER — ALBUTEROL SULFATE 90 UG/1
108 (90 BASE) INHALANT RESPIRATORY (INHALATION)
Refills: 0 | Status: ACTIVE | COMMUNITY

## 2024-10-25 ENCOUNTER — RX RENEWAL (OUTPATIENT)
Age: 64
End: 2024-10-25

## 2024-12-16 ENCOUNTER — APPOINTMENT (OUTPATIENT)
Dept: UROLOGY | Facility: CLINIC | Age: 64
End: 2024-12-16
Payer: COMMERCIAL

## 2024-12-16 ENCOUNTER — OUTPATIENT (OUTPATIENT)
Dept: OUTPATIENT SERVICES | Facility: HOSPITAL | Age: 64
LOS: 1 days | End: 2024-12-16
Payer: COMMERCIAL

## 2024-12-16 DIAGNOSIS — Z98.890 OTHER SPECIFIED POSTPROCEDURAL STATES: Chronic | ICD-10-CM

## 2024-12-16 DIAGNOSIS — R35.0 FREQUENCY OF MICTURITION: ICD-10-CM

## 2024-12-16 DIAGNOSIS — Z98.891 HISTORY OF UTERINE SCAR FROM PREVIOUS SURGERY: Chronic | ICD-10-CM

## 2024-12-16 DIAGNOSIS — Z90.12 ACQUIRED ABSENCE OF LEFT BREAST AND NIPPLE: Chronic | ICD-10-CM

## 2024-12-16 PROCEDURE — 99213 OFFICE O/P EST LOW 20 MIN: CPT

## 2024-12-16 PROCEDURE — G2211 COMPLEX E/M VISIT ADD ON: CPT | Mod: NC

## 2024-12-16 PROCEDURE — 76775 US EXAM ABDO BACK WALL LIM: CPT | Mod: 26

## 2024-12-16 PROCEDURE — 76775 US EXAM ABDO BACK WALL LIM: CPT

## 2024-12-17 DIAGNOSIS — N28.1 CYST OF KIDNEY, ACQUIRED: ICD-10-CM

## 2025-04-07 ENCOUNTER — APPOINTMENT (OUTPATIENT)
Dept: PULMONOLOGY | Facility: CLINIC | Age: 65
End: 2025-04-07

## 2025-05-19 ENCOUNTER — OUTPATIENT (OUTPATIENT)
Dept: OUTPATIENT SERVICES | Facility: HOSPITAL | Age: 65
LOS: 1 days | End: 2025-05-19
Payer: MEDICARE

## 2025-05-19 ENCOUNTER — APPOINTMENT (OUTPATIENT)
Dept: MAMMOGRAPHY | Facility: CLINIC | Age: 65
End: 2025-05-19
Payer: MEDICARE

## 2025-05-19 ENCOUNTER — APPOINTMENT (OUTPATIENT)
Dept: ULTRASOUND IMAGING | Facility: CLINIC | Age: 65
End: 2025-05-19
Payer: MEDICARE

## 2025-05-19 ENCOUNTER — RESULT REVIEW (OUTPATIENT)
Age: 65
End: 2025-05-19

## 2025-05-19 DIAGNOSIS — Z98.890 OTHER SPECIFIED POSTPROCEDURAL STATES: Chronic | ICD-10-CM

## 2025-05-19 DIAGNOSIS — C50.912 MALIGNANT NEOPLASM OF UNSPECIFIED SITE OF LEFT FEMALE BREAST: ICD-10-CM

## 2025-05-19 DIAGNOSIS — Z98.891 HISTORY OF UTERINE SCAR FROM PREVIOUS SURGERY: Chronic | ICD-10-CM

## 2025-05-19 DIAGNOSIS — Z90.12 ACQUIRED ABSENCE OF LEFT BREAST AND NIPPLE: Chronic | ICD-10-CM

## 2025-05-19 PROCEDURE — 77063 BREAST TOMOSYNTHESIS BI: CPT | Mod: 26,52

## 2025-05-19 PROCEDURE — 76641 ULTRASOUND BREAST COMPLETE: CPT | Mod: 26,RT,GA

## 2025-05-19 PROCEDURE — 77063 BREAST TOMOSYNTHESIS BI: CPT

## 2025-05-19 PROCEDURE — 76641 ULTRASOUND BREAST COMPLETE: CPT

## 2025-05-19 PROCEDURE — 77067 SCR MAMMO BI INCL CAD: CPT | Mod: 26,RT,52

## 2025-05-19 PROCEDURE — 77067 SCR MAMMO BI INCL CAD: CPT

## 2025-06-20 ENCOUNTER — APPOINTMENT (OUTPATIENT)
Dept: SURGICAL ONCOLOGY | Facility: CLINIC | Age: 65
End: 2025-06-20
Payer: MEDICARE

## 2025-06-20 VITALS
HEIGHT: 59 IN | DIASTOLIC BLOOD PRESSURE: 60 MMHG | WEIGHT: 155 LBS | BODY MASS INDEX: 31.25 KG/M2 | HEART RATE: 52 BPM | OXYGEN SATURATION: 97 % | SYSTOLIC BLOOD PRESSURE: 116 MMHG

## 2025-06-20 PROCEDURE — 99204 OFFICE O/P NEW MOD 45 MIN: CPT

## (undated) DEVICE — VENODYNE/SCD SLEEVE CALF MEDIUM

## (undated) DEVICE — POSITIONER PURPLE ARM ONE STEP (LARGE)

## (undated) DEVICE — NDL HYPO SAFE 18G X 1.5" (PINK)

## (undated) DEVICE — UTERINE MANIPULATOR CLINICAL INNOVATIONS CLEARVIEW 7CM

## (undated) DEVICE — TUBING STRYKEFLOW II SUCTION / IRRIGATOR

## (undated) DEVICE — GOWN TRIMAX LG

## (undated) DEVICE — TROCAR GELPOINT MINI ADVANCED

## (undated) DEVICE — ENDOCATCH II 15MM

## (undated) DEVICE — GLV 6 PROTEXIS (WHITE)

## (undated) DEVICE — TROCAR COVIDIEN VERSAPORT BLADELESS OPTICAL 5MM STANDARD

## (undated) DEVICE — SYR LUER LOK 20CC

## (undated) DEVICE — POSITIONER PINK PAD PIGAZZI SYSTEM

## (undated) DEVICE — TUBING INSUFFLATION LAP FILTER 10FT

## (undated) DEVICE — DRSG TELFA 3 X 8

## (undated) DEVICE — UTERINE MANIPULATOR COOPER SURGICAL 5MM 33CM GREEN

## (undated) DEVICE — LIGASURE BLUNT TIP 37CM

## (undated) DEVICE — GLV 7 PROTEXIS (WHITE)

## (undated) DEVICE — BLADE SCALPEL SAFETYLOCK #15

## (undated) DEVICE — FOLEY TRAY 16FR LF URINE METER SURESTEP

## (undated) DEVICE — TROCAR COVIDIEN VERSAONE BLADELESS FIXATION 11MM STANDARD

## (undated) DEVICE — PACK GYN LAPAROSCOPY

## (undated) DEVICE — D HELP - CLEARVIEW CLEARIFY SYSTEM

## (undated) DEVICE — SUT POLYSORB 0 30" GS-23

## (undated) DEVICE — GLV 7.5 PROTEXIS (WHITE)

## (undated) DEVICE — SUT MONOCRYL 4-0 27" PS-2 UNDYED

## (undated) DEVICE — APPLICATOR SURGICEL LAP TROCAR POINT 2.5MM X 150MM

## (undated) DEVICE — DRAPE MAYO STAND 30"

## (undated) DEVICE — ENDOCATCH 10MM SPECIMEN POUCH

## (undated) DEVICE — DRSG STERISTRIPS 0.5 X 4"

## (undated) DEVICE — TROCAR COVIDIEN VERSAONE BLADED FIXATION 11MM STANDARD

## (undated) DEVICE — WARMING BLANKET UPPER ADULT

## (undated) DEVICE — SOL IRR POUR NS 0.9% 1000ML

## (undated) DEVICE — BLADE SURGICAL #10 CARBON

## (undated) DEVICE — LIGASURE MARYLAND 37CM

## (undated) DEVICE — GLV 6.5 PROTEXIS (WHITE)

## (undated) DEVICE — SUT POLYSORB 2-0 30" V-20 UNDYED

## (undated) DEVICE — DRAPE TOWEL BLUE STICKY